# Patient Record
Sex: MALE | Race: WHITE | Employment: FULL TIME
[De-identification: names, ages, dates, MRNs, and addresses within clinical notes are randomized per-mention and may not be internally consistent; named-entity substitution may affect disease eponyms.]

---

## 2017-02-15 ENCOUNTER — OFFICE VISIT (OUTPATIENT)
Dept: INTERNAL MEDICINE | Facility: CLINIC | Age: 37
End: 2017-02-15

## 2017-02-15 VITALS
DIASTOLIC BLOOD PRESSURE: 64 MMHG | SYSTOLIC BLOOD PRESSURE: 118 MMHG | BODY MASS INDEX: 32.21 KG/M2 | HEIGHT: 70 IN | WEIGHT: 225 LBS

## 2017-02-15 DIAGNOSIS — J06.9 UPPER RESPIRATORY TRACT INFECTION, UNSPECIFIED TYPE: Primary | ICD-10-CM

## 2017-02-15 PROCEDURE — 99213 OFFICE O/P EST LOW 20 MIN: CPT | Performed by: INTERNAL MEDICINE

## 2017-02-15 RX ORDER — AZITHROMYCIN 500 MG/1
500 TABLET, FILM COATED ORAL DAILY
Qty: 1 PACKET | Refills: 0 | Status: SHIPPED | OUTPATIENT
Start: 2017-02-15 | End: 2017-02-18

## 2017-02-15 ASSESSMENT — ENCOUNTER SYMPTOMS
SPUTUM PRODUCTION: 0
SORE THROAT: 1
CONSTIPATION: 0
ABDOMINAL PAIN: 0
WHEEZING: 0
NAUSEA: 0
SHORTNESS OF BREATH: 0
VOMITING: 0
DIARRHEA: 0
COUGH: 1

## 2017-11-20 ENCOUNTER — OFFICE VISIT (OUTPATIENT)
Dept: INTERNAL MEDICINE CLINIC | Age: 37
End: 2017-11-20
Payer: COMMERCIAL

## 2017-11-20 VITALS
WEIGHT: 215 LBS | SYSTOLIC BLOOD PRESSURE: 104 MMHG | DIASTOLIC BLOOD PRESSURE: 60 MMHG | TEMPERATURE: 98.6 F | BODY MASS INDEX: 30.78 KG/M2 | HEART RATE: 100 BPM | OXYGEN SATURATION: 97 % | RESPIRATION RATE: 13 BRPM | HEIGHT: 70 IN

## 2017-11-20 DIAGNOSIS — J34.89 RHINORRHEA: ICD-10-CM

## 2017-11-20 DIAGNOSIS — J01.10 ACUTE NON-RECURRENT FRONTAL SINUSITIS: Primary | ICD-10-CM

## 2017-11-20 PROCEDURE — G8427 DOCREV CUR MEDS BY ELIG CLIN: HCPCS | Performed by: NURSE PRACTITIONER

## 2017-11-20 PROCEDURE — G8417 CALC BMI ABV UP PARAM F/U: HCPCS | Performed by: NURSE PRACTITIONER

## 2017-11-20 PROCEDURE — 1036F TOBACCO NON-USER: CPT | Performed by: NURSE PRACTITIONER

## 2017-11-20 PROCEDURE — 99213 OFFICE O/P EST LOW 20 MIN: CPT | Performed by: NURSE PRACTITIONER

## 2017-11-20 PROCEDURE — G8484 FLU IMMUNIZE NO ADMIN: HCPCS | Performed by: NURSE PRACTITIONER

## 2017-11-20 RX ORDER — AMOXICILLIN AND CLAVULANATE POTASSIUM 875; 125 MG/1; MG/1
1 TABLET, FILM COATED ORAL 2 TIMES DAILY
Qty: 20 TABLET | Refills: 0 | Status: SHIPPED | OUTPATIENT
Start: 2017-11-20 | End: 2017-11-30

## 2017-11-20 RX ORDER — FLUTICASONE PROPIONATE 50 MCG
1 SPRAY, SUSPENSION (ML) NASAL DAILY
Qty: 1 BOTTLE | Refills: 3 | Status: SHIPPED | OUTPATIENT
Start: 2017-11-20 | End: 2018-09-11 | Stop reason: ALTCHOICE

## 2017-11-20 RX ORDER — LORATADINE AND PSEUDOEPHEDRINE 10; 240 MG/1; MG/1
1 TABLET, EXTENDED RELEASE ORAL DAILY
Qty: 14 TABLET | Refills: 0 | Status: SHIPPED | OUTPATIENT
Start: 2017-11-20 | End: 2018-09-11 | Stop reason: ALTCHOICE

## 2017-11-20 ASSESSMENT — ENCOUNTER SYMPTOMS
RHINORRHEA: 1
SINUS PRESSURE: 1
SHORTNESS OF BREATH: 0
COUGH: 1
ABDOMINAL PAIN: 0
BLOOD IN STOOL: 0
SORE THROAT: 0
NAUSEA: 0
EYE DISCHARGE: 0

## 2017-11-20 ASSESSMENT — PATIENT HEALTH QUESTIONNAIRE - PHQ9
2. FEELING DOWN, DEPRESSED OR HOPELESS: 0
SUM OF ALL RESPONSES TO PHQ QUESTIONS 1-9: 0
1. LITTLE INTEREST OR PLEASURE IN DOING THINGS: 0
SUM OF ALL RESPONSES TO PHQ9 QUESTIONS 1 & 2: 0

## 2017-11-20 NOTE — PROGRESS NOTES
Visit Information    Have you changed or started any medications since your last visit including any over-the-counter medicines, vitamins, or herbal medicines? no   Are you having any side effects from any of your medications? -  no  Have you stopped taking any of your medications? Is so, why? -  no    Have you seen any other physician or provider since your last visit? No  Have you had any other diagnostic tests since your last visit? No  Have you been seen in the emergency room and/or had an admission to a hospital since we last saw you? No  Have you had your routine dental cleaning in the past 6 months? yes - 2017    Have you activated your [employer] account? If not, what are your barriers?  No: Code      Patient Care Team:  Kristopher Hyatt MD as PCP - General (Internal Medicine)    Medical History Review  Past Medical, Family, and Social History reviewed and does not contribute to the patient presenting condition    Health Maintenance   Topic Date Due    HIV screen  1993    DTaP/Tdap/Td vaccine (1 - Tdap) 1997    Flu vaccine (1) 2018 (Originally 2017)

## 2017-11-20 NOTE — PROGRESS NOTES
[facility]  [facility]  [address]  [address] [facility] [address]  Dept: [phone]  Dept Fax: [phone]    Nolan Davies is a 42 y.o. male who presents today for his medical conditions/complaints as noted below. Nolan Davies is c/o of Influenza (pt c/o of nausea,fever,body aches,congestion, loss of appetite. Pt stated it started 2 days ago)        HPI:     Patient presents with:  pt c/o of nausea,fever,body aches,congestion, loss of appetite. Pt stated it started 2 days ago  Body aches,   HA , pressue   101 * at home   [address] . Past Medical History:   Diagnosis Date    Asymptomatic varicose veins     Cervicalgia     Disorders of bilirubin excretion     Disturbance of skin sensation     Unspecified sleep apnea       Past Surgical History:   Procedure Laterality Date    APPENDECTOMY         History reviewed. No pertinent family history. Social History   Substance Use Topics    Smoking status: Never Smoker    Smokeless tobacco: Never Used    Alcohol use No      Current Outpatient Prescriptions   Medication Sig Dispense Refill    amoxicillin-clavulanate (AUGMENTIN) 875-125 MG per tablet Take 1 tablet by mouth 2 times daily for 10 days 20 tablet 0    fluticasone (FLONASE) 50 MCG/ACT nasal spray 1 spray by Nasal route daily 1 Bottle 3    loratadine-pseudoephedrine (CLARITIN-D 24 HOUR)  MG per extended release tablet Take 1 tablet by mouth daily 14 tablet 0     No current facility-administered medications for this visit. No Known Allergies      Subjective:      Review of Systems   Constitutional: Positive for chills, fatigue and fever. Negative for activity change. HENT: Positive for congestion, postnasal drip, rhinorrhea (green yellow , running alot , blowing consitstenly ) and sinus pressure. Negative for sore throat. Eyes: Negative for discharge and visual disturbance. Respiratory: Positive for cough (minimal , dry ).  Negative for shortness of m)   Wt 215 lb (97.5 kg)   SpO2 97%   BMI 30.78 kg/m²     CBC: No results found for: WBC, RBC, HGB, HCT, MCV, MCH, MCHC, RDW, PLT, MPV  CMP:  No results found for: NA, K, CL, CO2, BUN, CREATININE, GFRAA, AGRATIO, LABGLOM, GLUCOSE, PROT, LABALBU, CALCIUM, BILITOT, ALKPHOS, AST, ALT  No results found for: LABA1C        Assessment:      1. Acute non-recurrent frontal sinusitis  amoxicillin-clavulanate (AUGMENTIN) 875-125 MG per tablet    loratadine-pseudoephedrine (CLARITIN-D 24 HOUR)  MG per extended release tablet   2. Rhinorrhea  fluticasone (FLONASE) 50 MCG/ACT nasal spray    loratadine-pseudoephedrine (CLARITIN-D 24 HOUR)  MG per extended release tablet       Plan:        Dorathy Cons received counseling on the following healthy behaviors: medication adherence  Reviewed prior labs and health maintenance. Continue current medications, diet and exercise. Discussed use, benefit, and side effects of prescribed medications. Barriers to medication compliance addressed. Patient given educational materials - see patient instructions. All patient questions answered. Patient voiced understanding. Return if symptoms worsen or fail to improve. No orders of the defined types were placed in this encounter. Orders Placed This Encounter   Medications    amoxicillin-clavulanate (AUGMENTIN) 875-125 MG per tablet     Sig: Take 1 tablet by mouth 2 times daily for 10 days     Dispense:  20 tablet     Refill:  0    fluticasone (FLONASE) 50 MCG/ACT nasal spray     Si spray by Nasal route daily     Dispense:  1 Bottle     Refill:  3    loratadine-pseudoephedrine (CLARITIN-D 24 HOUR)  MG per extended release tablet     Sig: Take 1 tablet by mouth daily     Dispense:  14 tablet     Refill:  0       Patient given verbal and/or written educational instructions. Follow up as directed. I have reviewed and agree with the nursing documentation.     Electronically signed by Marissa Roland NP on 11/20/2017 at 10:36 AM

## 2018-01-17 ENCOUNTER — OFFICE VISIT (OUTPATIENT)
Dept: INTERNAL MEDICINE CLINIC | Age: 38
End: 2018-01-17
Payer: COMMERCIAL

## 2018-01-17 VITALS
HEIGHT: 70 IN | DIASTOLIC BLOOD PRESSURE: 62 MMHG | HEART RATE: 67 BPM | SYSTOLIC BLOOD PRESSURE: 93 MMHG | WEIGHT: 214 LBS | BODY MASS INDEX: 30.64 KG/M2

## 2018-01-17 DIAGNOSIS — R40.0 SOMNOLENCE: ICD-10-CM

## 2018-01-17 DIAGNOSIS — G47.33 OSA (OBSTRUCTIVE SLEEP APNEA): Primary | ICD-10-CM

## 2018-01-17 DIAGNOSIS — Z00.00 PREVENTATIVE HEALTH CARE: ICD-10-CM

## 2018-01-17 DIAGNOSIS — R53.83 FATIGUE, UNSPECIFIED TYPE: ICD-10-CM

## 2018-01-17 PROCEDURE — G8417 CALC BMI ABV UP PARAM F/U: HCPCS | Performed by: NURSE PRACTITIONER

## 2018-01-17 PROCEDURE — 99213 OFFICE O/P EST LOW 20 MIN: CPT | Performed by: NURSE PRACTITIONER

## 2018-01-17 PROCEDURE — 1036F TOBACCO NON-USER: CPT | Performed by: NURSE PRACTITIONER

## 2018-01-17 PROCEDURE — G8427 DOCREV CUR MEDS BY ELIG CLIN: HCPCS | Performed by: NURSE PRACTITIONER

## 2018-01-17 PROCEDURE — G8484 FLU IMMUNIZE NO ADMIN: HCPCS | Performed by: NURSE PRACTITIONER

## 2018-01-17 ASSESSMENT — ENCOUNTER SYMPTOMS
EYE DISCHARGE: 0
BLOOD IN STOOL: 0
ABDOMINAL PAIN: 0
COUGH: 0
APNEA: 1
SHORTNESS OF BREATH: 0

## 2018-01-17 NOTE — PROGRESS NOTES
[facility]  [facility]  [address]  [address] [facility] [address]  Dept: [phone]  Dept Fax: [phone]    Gordy Dunaway is a 42 y.o. male who presents today for his medical conditions/complaints as noted below. Gordy Dunaway is c/o of Sleep Apnea (snoring, witnessed apnea, excessive daytime somnolence )        HPI:     Patient presents with:  Sleep Apnea: very loud snoring, witnessed apnea, excessive daytime somnolence             Past Medical History:   Diagnosis Date    Asymptomatic varicose veins     Cervicalgia     Disorders of bilirubin excretion     Disturbance of skin sensation     Unspecified sleep apnea       Past Surgical History:   Procedure Laterality Date    APPENDECTOMY         History reviewed. No pertinent family history. Social History   Substance Use Topics    Smoking status: Never Smoker    Smokeless tobacco: Never Used    Alcohol use No      Current Outpatient Prescriptions   Medication Sig Dispense Refill    fluticasone (FLONASE) 50 MCG/ACT nasal spray 1 spray by Nasal route daily 1 Bottle 3    loratadine-pseudoephedrine (CLARITIN-D 24 HOUR)  MG per extended release tablet Take 1 tablet by mouth daily 14 tablet 0     No current facility-administered medications for this visit. No Known Allergies      Subjective:      Review of Systems   Constitutional: Positive for fatigue (sleepiness t/o day ). Negative for activity change, chills and fever. Eyes: Negative for discharge and visual disturbance. Respiratory: Positive for apnea. Negative for cough and shortness of breath. Snoring    Cardiovascular: Negative for chest pain and leg swelling. Gastrointestinal: Negative for abdominal pain and blood in stool. Endocrine: Negative for cold intolerance and heat intolerance. Genitourinary: Negative for dysuria and flank pain. Musculoskeletal: Negative for joint swelling and myalgias. Skin: Negative for pallor and rash.    Neurological: Somnolence  TSH with Reflex   3. Fatigue, unspecified type  TSH with Reflex   4. Preventative health care  Comprehensive Metabolic Panel    CBC       Plan:      1. MAGDIEL (obstructive sleep apnea)  Mult sx  Baseline Diagnostic Sleep Study   2. Somnolence  Labs ordered    TSH with Reflex   3. Fatigue, unspecified type   Labs ordered        TSH with Reflex   4. Preventative health care  Comprehensive Metabolic Panel    CBC       Lissette Gent received counseling on the following healthy behaviors: medication adherence  Reviewed prior labs and health maintenance  Continue current medications, diet and exercise. Discussed use, benefit, and side effects of prescribed medications. Barriers to medication compliance addressed. Patient given educational materials - see patient instructions  Was a self-tracking handout given in paper form or via [employer]? Yes    Requested Prescriptions      No prescriptions requested or ordered in this encounter       All patient questions answered. Patient voiced understanding. Quality Measures    Body mass index is 30.64 kg/m². Elevated. Weight control planned discussed Healthy diet and regular exercise. BP: 93/62 Blood pressure is low. Treatment plan consists of No treatment change needed. No results found for: LDLCALC, LDLCHOLESTEROL, LDLDIRECT (goal LDL reduction with dx if diabetes is 50% LDL reduction)      PHQ Scores 11/20/2017   PHQ2 Score 0   PHQ9 Score 0     Interpretation of Total Score Depression Severity: 1-4 = Minimal depression, 5-9 = Mild depression, 10-14 = Moderate depression, 15-19 = Moderately severe depression, 20-27 = Severe depression    Return if symptoms worsen or fail to improve.     Orders Placed This Encounter   Procedures    TSH with Reflex     Standing Status:   Future     Standing Expiration Date:   1/18/2019    Comprehensive Metabolic Panel     Standing Status:   Future     Standing Expiration Date:   1/17/2019    CBC     Standing Status:   Future Standing Expiration Date:   1/17/2019    Baseline Diagnostic Sleep Study     Standing Status:   Future     Standing Expiration Date:   1/17/2019     Order Specific Question:   Adult or Pediatric     Answer:   Adult Study (>7 Years)     Order Specific Question:   Location For Sleep Study     [facility]: [facility] Specific Question:   Select Sleep Lab Location     Answer:   [address]     No orders of the defined types were placed in this encounter. Patient given verbal and/or written educational instructions. Follow up as directed. I have reviewed and agree with the nursing documentation.     Electronically signed by Elyse Fierro NP on 1/17/2018 at 4:33 PM

## 2018-02-19 ENCOUNTER — HOSPITAL ENCOUNTER (OUTPATIENT)
Dept: SLEEP CENTER | Age: 38
Discharge: HOME | End: 2018-02-21
Payer: COMMERCIAL

## 2018-02-19 DIAGNOSIS — G47.33 OSA (OBSTRUCTIVE SLEEP APNEA): ICD-10-CM

## 2018-02-19 PROCEDURE — 95810 POLYSOM 6/> YRS 4/> PARAM: CPT

## 2018-02-20 VITALS
HEIGHT: 70 IN | HEART RATE: 67 BPM | WEIGHT: 214 LBS | BODY MASS INDEX: 30.64 KG/M2 | SYSTOLIC BLOOD PRESSURE: 93 MMHG | RESPIRATION RATE: 20 BRPM | DIASTOLIC BLOOD PRESSURE: 62 MMHG

## 2018-02-20 ASSESSMENT — SLEEP AND FATIGUE QUESTIONNAIRES
HOW LIKELY ARE YOU TO NOD OFF OR FALL ASLEEP WHILE SITTING AND TALKING TO SOMEONE: 0
HOW LIKELY ARE YOU TO NOD OFF OR FALL ASLEEP WHILE WATCHING TV: 3
HOW LIKELY ARE YOU TO NOD OFF OR FALL ASLEEP WHILE SITTING AND READING: 2
HOW LIKELY ARE YOU TO NOD OFF OR FALL ASLEEP WHILE LYING DOWN TO REST IN THE AFTERNOON WHEN CIRCUMSTANCES PERMIT: 3
HOW LIKELY ARE YOU TO NOD OFF OR FALL ASLEEP WHEN YOU ARE A PASSENGER IN A CAR FOR AN HOUR WITHOUT A BREAK: 2
ESS TOTAL SCORE: 12
HOW LIKELY ARE YOU TO NOD OFF OR FALL ASLEEP WHILE SITTING INACTIVE IN A PUBLIC PLACE: 0
HOW LIKELY ARE YOU TO NOD OFF OR FALL ASLEEP IN A CAR, WHILE STOPPED FOR A FEW MINUTES IN TRAFFIC: 0
HOW LIKELY ARE YOU TO NOD OFF OR FALL ASLEEP WHILE SITTING QUIETLY AFTER LUNCH WITHOUT ALCOHOL: 2

## 2018-03-12 ENCOUNTER — TELEPHONE (OUTPATIENT)
Dept: INTERNAL MEDICINE CLINIC | Age: 38
End: 2018-03-12

## 2018-03-12 DIAGNOSIS — G47.33 OSA (OBSTRUCTIVE SLEEP APNEA): ICD-10-CM

## 2018-04-04 ENCOUNTER — HOSPITAL ENCOUNTER (OUTPATIENT)
Dept: SLEEP CENTER | Age: 38
Discharge: HOME | End: 2018-04-06
Payer: COMMERCIAL

## 2018-04-04 VITALS
HEIGHT: 70 IN | HEART RATE: 76 BPM | RESPIRATION RATE: 20 BRPM | SYSTOLIC BLOOD PRESSURE: 90 MMHG | WEIGHT: 215 LBS | BODY MASS INDEX: 30.78 KG/M2 | DIASTOLIC BLOOD PRESSURE: 60 MMHG

## 2018-04-04 DIAGNOSIS — G47.33 OSA (OBSTRUCTIVE SLEEP APNEA): ICD-10-CM

## 2018-04-04 PROCEDURE — 95811 POLYSOM 6/>YRS CPAP 4/> PARM: CPT

## 2018-08-07 ENCOUNTER — TELEPHONE (OUTPATIENT)
Dept: INTERNAL MEDICINE CLINIC | Age: 38
End: 2018-08-07

## 2018-08-07 NOTE — TELEPHONE ENCOUNTER
Patients mother diagnosed with breast cancer, which was positive for pathogenic mutation (BCRA2) and her oncologist is advising all of her children to have testing completed. Please advise and let patient know.

## 2018-08-14 ENCOUNTER — TELEPHONE (OUTPATIENT)
Dept: INTERNAL MEDICINE CLINIC | Age: 38
End: 2018-08-14

## 2018-08-14 DIAGNOSIS — Z14.8 GENETIC CARRIER OF HERITABLE CANCER: Primary | ICD-10-CM

## 2018-08-23 ENCOUNTER — TELEPHONE (OUTPATIENT)
Dept: ONCOLOGY | Age: 38
End: 2018-08-23

## 2018-09-10 ENCOUNTER — HOSPITAL ENCOUNTER (OUTPATIENT)
Age: 38
Discharge: HOME | End: 2018-09-10
Payer: COMMERCIAL

## 2018-09-10 DIAGNOSIS — R53.83 FATIGUE, UNSPECIFIED TYPE: ICD-10-CM

## 2018-09-10 DIAGNOSIS — R40.0 SOMNOLENCE: ICD-10-CM

## 2018-09-10 DIAGNOSIS — Z00.00 PREVENTATIVE HEALTH CARE: ICD-10-CM

## 2018-09-10 LAB
ALBUMIN SERPL-MCNC: 4.6 G/DL (ref 3.5–5.2)
ALBUMIN/GLOBULIN RATIO: ABNORMAL (ref 1–2.5)
ALP BLD-CCNC: 63 U/L (ref 40–129)
ALT SERPL-CCNC: 28 U/L (ref 5–41)
ANION GAP SERPL CALCULATED.3IONS-SCNC: 11 MMOL/L (ref 9–17)
AST SERPL-CCNC: 24 U/L
BILIRUB SERPL-MCNC: 1.47 MG/DL (ref 0.3–1.2)
BUN BLDV-MCNC: 13 MG/DL (ref 6–20)
BUN/CREAT BLD: ABNORMAL (ref 9–20)
CALCIUM SERPL-MCNC: 9.4 MG/DL (ref 8.6–10.4)
CHLORIDE BLD-SCNC: 104 MMOL/L (ref 98–107)
CO2: 28 MMOL/L (ref 20–31)
CREAT SERPL-MCNC: 0.9 MG/DL (ref 0.7–1.2)
GFR AFRICAN AMERICAN: >60 ML/MIN
GFR NON-AFRICAN AMERICAN: >60 ML/MIN
GFR SERPL CREATININE-BSD FRML MDRD: ABNORMAL ML/MIN/{1.73_M2}
GFR SERPL CREATININE-BSD FRML MDRD: ABNORMAL ML/MIN/{1.73_M2}
GLUCOSE BLD-MCNC: 100 MG/DL (ref 70–99)
HCT VFR BLD CALC: 47.4 % (ref 41–53)
HEMOGLOBIN: 15.7 G/DL (ref 13.5–17.5)
MCH RBC QN AUTO: 30 PG (ref 26–34)
MCHC RBC AUTO-ENTMCNC: 33.1 G/DL (ref 31–37)
MCV RBC AUTO: 90.4 FL (ref 80–100)
NRBC AUTOMATED: NORMAL PER 100 WBC
PDW BLD-RTO: 13.4 % (ref 11.5–14.9)
PLATELET # BLD: 197 K/UL (ref 150–450)
PMV BLD AUTO: 8.6 FL (ref 6–12)
POTASSIUM SERPL-SCNC: 4.4 MMOL/L (ref 3.7–5.3)
RBC # BLD: 5.24 M/UL (ref 4.5–5.9)
SODIUM BLD-SCNC: 143 MMOL/L (ref 135–144)
TOTAL PROTEIN: 6.8 G/DL (ref 6.4–8.3)
TSH SERPL DL<=0.05 MIU/L-ACNC: 1.21 MIU/L (ref 0.3–5)
WBC # BLD: 5.5 K/UL (ref 3.5–11)

## 2018-09-10 PROCEDURE — 36415 COLL VENOUS BLD VENIPUNCTURE: CPT

## 2018-09-10 PROCEDURE — 84443 ASSAY THYROID STIM HORMONE: CPT

## 2018-09-10 PROCEDURE — 80053 COMPREHEN METABOLIC PANEL: CPT

## 2018-09-10 PROCEDURE — 85027 COMPLETE CBC AUTOMATED: CPT

## 2018-09-11 ENCOUNTER — INITIAL CONSULT (OUTPATIENT)
Dept: ONCOLOGY | Age: 38
End: 2018-09-11
Payer: COMMERCIAL

## 2018-09-11 ENCOUNTER — CLINICAL DOCUMENTATION (OUTPATIENT)
Dept: ONCOLOGY | Age: 38
End: 2018-09-11

## 2018-09-11 VITALS
SYSTOLIC BLOOD PRESSURE: 111 MMHG | HEART RATE: 93 BPM | WEIGHT: 228.19 LBS | TEMPERATURE: 98.8 F | BODY MASS INDEX: 32.67 KG/M2 | HEIGHT: 70 IN | DIASTOLIC BLOOD PRESSURE: 68 MMHG

## 2018-09-11 DIAGNOSIS — Z84.81 FAMILY HISTORY OF BRCA GENE MUTATION: Primary | ICD-10-CM

## 2018-09-11 PROCEDURE — 99244 OFF/OP CNSLTJ NEW/EST MOD 40: CPT | Performed by: INTERNAL MEDICINE

## 2018-09-11 PROCEDURE — G8417 CALC BMI ABV UP PARAM F/U: HCPCS | Performed by: INTERNAL MEDICINE

## 2018-09-11 PROCEDURE — G8427 DOCREV CUR MEDS BY ELIG CLIN: HCPCS | Performed by: INTERNAL MEDICINE

## 2018-09-11 PROCEDURE — 99201 HC NEW PT, E/M LEVEL 1: CPT | Performed by: INTERNAL MEDICINE

## 2018-09-24 ENCOUNTER — TELEPHONE (OUTPATIENT)
Dept: ONCOLOGY | Age: 38
End: 2018-09-24

## 2018-09-26 NOTE — TELEPHONE ENCOUNTER
[facility]   Hereditary Cancer Risk Assessment     Name: Shawna Cevallos  YOB: 1980  Date of Results Disclosure: 9/24/18    HISTORY   Mr. Jonas Tovar was seen for genetic counseling on 9/11/18 at the request of Dr. Jo Sánchez due to his family history of breast cancer due to a known BRCA2 gene mutation. At that time, Mr. Jonas Tovar chose to pursue genetic testing for the familial BRCA2 gene mutation. These results were discussed with Mr. Jonas Tovar on 9/24/18 via telephone. A summary of Mr. Abhishek Davenport results and recommendations are below. RESULTS  [employer]   Specific Site Analysis BRCA2 [id number]: NEGATIVE - NO CLINICALLY SIGNIFICANT MUTATIONS DETECTED   Please refer to genetic test report for technical details. We discussed that Mr. Abhishek Davenport test result confirms that he does not carry the pathogenic BRCA2 mutation that has previously been identified in the family. Mr. Abhishek Davenport lifetime risk for cancer is expected to be equal to the general population risk. Therefore, Mr. Jonas Tovar should continue general population cancer screening guidelines as directed by his physicians. Of note, Mr. Jonas Tovar has a significant family history of prostate cancer which has not been linked entirely to the familial BRCA2 mutation. This includes a maternal uncle with prostate cancer in his 62s and a maternal great uncle with prostate cancer. Therefore, Mr. Jonas Tovar may consider increased prostate cancer screening based on his family history and we encourage him to discuss this with his physicians. RECOMMENDATIONS FOR FAMILY MEMBERS   1) Genetic testing is not recommended for Mr. Abhishek Davenport children based on his negative test results. However, this recommendation does not take into consideration any family history of cancer in their maternal family. 2) We encourage Mr. Abhishek Davenport relatives to consider genetic counseling and testing for the familial BRCA2 gene mutation.  Relatives may contact the [facility])

## 2019-06-07 ENCOUNTER — TELEPHONE (OUTPATIENT)
Dept: INTERNAL MEDICINE CLINIC | Age: 39
End: 2019-06-07

## 2019-06-11 ENCOUNTER — OFFICE VISIT (OUTPATIENT)
Dept: INTERNAL MEDICINE CLINIC | Age: 39
End: 2019-06-11
Payer: COMMERCIAL

## 2019-06-11 VITALS — HEIGHT: 70 IN | BODY MASS INDEX: 34.22 KG/M2 | WEIGHT: 239 LBS

## 2019-06-11 DIAGNOSIS — L23.7 CONTACT DERMATITIS DUE TO POISON IVY: Primary | ICD-10-CM

## 2019-06-11 PROCEDURE — G8417 CALC BMI ABV UP PARAM F/U: HCPCS | Performed by: PHYSICIAN ASSISTANT

## 2019-06-11 PROCEDURE — 1036F TOBACCO NON-USER: CPT | Performed by: PHYSICIAN ASSISTANT

## 2019-06-11 PROCEDURE — 99213 OFFICE O/P EST LOW 20 MIN: CPT | Performed by: PHYSICIAN ASSISTANT

## 2019-06-11 PROCEDURE — G8427 DOCREV CUR MEDS BY ELIG CLIN: HCPCS | Performed by: PHYSICIAN ASSISTANT

## 2019-06-11 RX ORDER — CLOBETASOL PROPIONATE 0.5 MG/G
CREAM TOPICAL DAILY
Qty: 60 G | Refills: 0 | Status: SHIPPED | OUTPATIENT
Start: 2019-06-11 | End: 2020-10-15

## 2019-06-11 ASSESSMENT — ENCOUNTER SYMPTOMS
NAUSEA: 0
CHEST TIGHTNESS: 0
COUGH: 0
EYE ITCHING: 0
VOMITING: 0
SHORTNESS OF BREATH: 0
ABDOMINAL PAIN: 0
EYE DISCHARGE: 0
WHEEZING: 0
EYE REDNESS: 0

## 2019-06-11 ASSESSMENT — PATIENT HEALTH QUESTIONNAIRE - PHQ9
1. LITTLE INTEREST OR PLEASURE IN DOING THINGS: 0
SUM OF ALL RESPONSES TO PHQ9 QUESTIONS 1 & 2: 0
SUM OF ALL RESPONSES TO PHQ QUESTIONS 1-9: 0
SUM OF ALL RESPONSES TO PHQ QUESTIONS 1-9: 0
2. FEELING DOWN, DEPRESSED OR HOPELESS: 0

## 2019-06-11 NOTE — PROGRESS NOTES
Visit Information    Have you changed or started any medications since your last visit including any over-the-counter medicines, vitamins, or herbal medicines? no   Are you having any side effects from any of your medications? -  no  Have you stopped taking any of your medications? Is so, why? -  no    Have you seen any other physician or provider since your last visit? No  Have you had any other diagnostic tests since your last visit? No  Have you been seen in the emergency room and/or had an admission to a hospital since we last saw you? No  Have you had your routine dental cleaning in the past 6 months? no    Have you activated your [employer] account? If not, what are your barriers?  No:      Patient Care Team:  Yao Estrella MD as PCP - General (Internal Medicine)  Yao Estrella MD as PCP - [facility]    Medical History Review  Past Medical, Family, and Social History reviewed and does contribute to the patient presenting condition    Health Maintenance   Topic Date Due    HIV screen  08/13/1993    DTaP/Tdap/Td vaccine (1 - Tdap) 08/13/1997    Lipid screen  08/13/2018    Flu vaccine (Season Ended) 09/01/2019    Pneumococcal 0-64 years Vaccine  Aged Out
adenopathy. PHYSICAL EXAM:      Vitals:    06/11/19 1341   Weight: 239 lb (108.4 kg)   Height: 5' 10\" (1.778 m)     General - alert, well appearing, and in no distress  Skin - normal coloration and turgor, erythematous maculopapular rash present in clusters, forearms, lower extremities, no vesicles present, covered in calamine lotion    Eyes - pupils equal and reactive, extraocular eye movements intact  Mouth - mucous membranes moist  Neck - supple, no significant adenopathy, no palpable masses  Chest - clear to auscultation, symmetric air entry  Heart - normal rate, regular rhythm, no murmur  Abdomen - non distended, soft, no tenderness to palpation   Neurological - alert, oriented, normal speech, no focal sensory or motor deficit  Extremities - no pedal edema    ASSESSMENT AND PLAN:      1. Contact dermatitis due to poison ivy  - will start high potency topical steroid, avoid use for greater than two weeks   - anti histamine as needed, benadryl at night time  - will consider oral prednisone taper if symptoms not controlled with above   - clobetasol prop emollient base (CLOBETASOL PROPIONATE E) 0.05 % CREA; Apply topically daily  Dispense: 60 g; Refill: 0    FOLLOW UP AND INSTRUCTIONS:   Return if symptoms worsen or fail to improve. Discussed use, benefit, and side effects of prescribed medications. All patient questions answered. Patient voiced understanding. Patient given educational materials - see patient instructions    Shirley MALONE [facility]  6/11/2019, 2:03 PM    Please note that this chart wasgenerated using voice recognition Dragon dictation software. Although every effort was made to ensure the accuracy of this automated transcription, some errors in transcription may have occurred.

## 2019-06-13 ENCOUNTER — TELEPHONE (OUTPATIENT)
Dept: INTERNAL MEDICINE CLINIC | Age: 39
End: 2019-06-13

## 2019-06-13 DIAGNOSIS — L23.7 CONTACT DERMATITIS DUE TO POISON IVY: Primary | ICD-10-CM

## 2019-06-13 RX ORDER — METHYLPREDNISOLONE 4 MG/1
TABLET ORAL
Qty: 1 KIT | Refills: 0 | Status: SHIPPED | OUTPATIENT
Start: 2019-06-13 | End: 2019-06-19

## 2019-06-13 NOTE — TELEPHONE ENCOUNTER
Call from pt he was seen for poison Ivy and he iss using the cream but the rash is spreading. Now on his stomach and sind he was told that a sternoid if needed .   Pharm [facility]

## 2019-07-01 ENCOUNTER — TELEPHONE (OUTPATIENT)
Dept: INTERNAL MEDICINE CLINIC | Age: 39
End: 2019-07-01

## 2019-09-24 ENCOUNTER — OFFICE VISIT (OUTPATIENT)
Dept: INTERNAL MEDICINE CLINIC | Age: 39
End: 2019-09-24
Payer: COMMERCIAL

## 2019-09-24 VITALS
OXYGEN SATURATION: 97 % | HEIGHT: 70 IN | HEART RATE: 77 BPM | WEIGHT: 219 LBS | BODY MASS INDEX: 31.35 KG/M2 | DIASTOLIC BLOOD PRESSURE: 62 MMHG | SYSTOLIC BLOOD PRESSURE: 136 MMHG

## 2019-09-24 DIAGNOSIS — R53.83 FATIGUE DUE TO SLEEP PATTERN DISTURBANCE: ICD-10-CM

## 2019-09-24 DIAGNOSIS — E66.9 OBESITY (BMI 30.0-34.9): ICD-10-CM

## 2019-09-24 DIAGNOSIS — G47.9 FATIGUE DUE TO SLEEP PATTERN DISTURBANCE: ICD-10-CM

## 2019-09-24 DIAGNOSIS — G47.33 OSA (OBSTRUCTIVE SLEEP APNEA): Primary | ICD-10-CM

## 2019-09-24 PROCEDURE — 99213 OFFICE O/P EST LOW 20 MIN: CPT | Performed by: PHYSICIAN ASSISTANT

## 2019-09-24 PROCEDURE — 1036F TOBACCO NON-USER: CPT | Performed by: PHYSICIAN ASSISTANT

## 2019-09-24 PROCEDURE — G8427 DOCREV CUR MEDS BY ELIG CLIN: HCPCS | Performed by: PHYSICIAN ASSISTANT

## 2019-09-24 PROCEDURE — G8417 CALC BMI ABV UP PARAM F/U: HCPCS | Performed by: PHYSICIAN ASSISTANT

## 2019-09-24 NOTE — PROGRESS NOTES
[facility]  [facility]  [address] [address]  Dept: [phone]  Dept Fax: [phone]    OfficeProgress/Follow Up Note  Date of patient's visit: 9/24/2019  Patient's Name:  Daysi Howell YOB: 1980            Patient Care Team:  Leonel Nichole MD as PCP - General (Internal Medicine)  Leonel Nichole MD as PCP - [facility]    REASON FOR VISIT:  Routine outpatient follow up    HISTORY OF PRESENT ILLNESS:      Chief Complaint   Patient presents with    Sleep Apnea     pt new dx, a yeara ago by [facility], would like fmla forms completed     History was obtained from the patient. Daysi Howell is a 37 y.o. male here today for above. Obstructive sleep apnea. Patient reports occasionally missing work due to long shifts. He requires CPAP use nightly. Reports daytime somnolence, fatigue without use of CPAP. No recent chest pain/pressure. No dyspnea on exertion. Patient Active Problem List   Diagnosis    MAGDIEL (obstructive sleep apnea)    Obesity (BMI 30.0-34. 9)    Fatigue due to sleep pattern disturbance     Health Maintenance Due   Topic Date Due    HIV screen  08/13/1993    DTaP/Tdap/Td vaccine (1 - Tdap) 08/13/1997    Lipid screen  08/13/2018    Flu vaccine (1) 09/01/2019     MEDICATIONS:      Current Outpatient Medications   Medication Sig Dispense Refill    clobetasol prop emollient base (CLOBETASOL PROPIONATE E) 0.05 % CREA Apply topically daily (Patient not taking: Reported on 9/24/2019) 60 g 0     No current facility-administered medications for this visit. ALLERGIES:    No Known Allergies      SOCIAL HISTORY    Reviewed and no change from previous record. Manoj Le  reports that he has never smoked.  He has never used smokeless tobacco.    FAMILY HISTORY:    Reviewed and No change from previous visit    REVIEW OF SYSTEMS:    Review of Systems    PHYSICAL EXAM:      Vitals:    09/24/19 1352   BP: 136/62   Pulse: 77

## 2019-09-26 ENCOUNTER — TELEPHONE (OUTPATIENT)
Dept: INTERNAL MEDICINE CLINIC | Age: 39
End: 2019-09-26

## 2020-06-29 ENCOUNTER — OFFICE VISIT (OUTPATIENT)
Dept: INTERNAL MEDICINE CLINIC | Age: 40
End: 2020-06-29
Payer: COMMERCIAL

## 2020-06-29 VITALS
OXYGEN SATURATION: 97 % | TEMPERATURE: 97.2 F | HEIGHT: 70 IN | BODY MASS INDEX: 32.5 KG/M2 | HEART RATE: 80 BPM | DIASTOLIC BLOOD PRESSURE: 70 MMHG | WEIGHT: 227 LBS | SYSTOLIC BLOOD PRESSURE: 120 MMHG

## 2020-06-29 PROCEDURE — 1036F TOBACCO NON-USER: CPT | Performed by: PHYSICIAN ASSISTANT

## 2020-06-29 PROCEDURE — G8427 DOCREV CUR MEDS BY ELIG CLIN: HCPCS | Performed by: PHYSICIAN ASSISTANT

## 2020-06-29 PROCEDURE — G8417 CALC BMI ABV UP PARAM F/U: HCPCS | Performed by: PHYSICIAN ASSISTANT

## 2020-06-29 PROCEDURE — 99214 OFFICE O/P EST MOD 30 MIN: CPT | Performed by: PHYSICIAN ASSISTANT

## 2020-06-29 RX ORDER — LORAZEPAM 0.5 MG/1
0.5 TABLET ORAL 3 TIMES DAILY PRN
Qty: 6 TABLET | Refills: 0 | Status: SHIPPED | OUTPATIENT
Start: 2020-06-29 | End: 2020-07-29

## 2020-06-29 ASSESSMENT — ENCOUNTER SYMPTOMS
BACK PAIN: 0
COUGH: 0
BLOOD IN STOOL: 0
ABDOMINAL PAIN: 0
NAUSEA: 0
EYE ITCHING: 0
CONSTIPATION: 0
RHINORRHEA: 0
EYE PAIN: 0
TROUBLE SWALLOWING: 0
VOICE CHANGE: 0
DIARRHEA: 0
SINUS PAIN: 0
WHEEZING: 0
COLOR CHANGE: 0
SHORTNESS OF BREATH: 0
VOMITING: 0
CHEST TIGHTNESS: 0
EYE DISCHARGE: 0
SORE THROAT: 0

## 2020-06-29 ASSESSMENT — PATIENT HEALTH QUESTIONNAIRE - PHQ9
SUM OF ALL RESPONSES TO PHQ QUESTIONS 1-9: 0
2. FEELING DOWN, DEPRESSED OR HOPELESS: 0
1. LITTLE INTEREST OR PLEASURE IN DOING THINGS: 0
SUM OF ALL RESPONSES TO PHQ QUESTIONS 1-9: 0
SUM OF ALL RESPONSES TO PHQ9 QUESTIONS 1 & 2: 0

## 2020-06-29 NOTE — PROGRESS NOTES
suspicious skin lesions noted  Eyes - pupils equal and reactive, extraocular eye movements intact  Ears - bilateral TM's and external ear canals normal  Nose - normal and patent, no erythema, discharge or polyps  Mouth - mucous membranes are moist, pharynx normal without lesions  Neck - supple, no significant adenopathy, no palpable masses   Lymphatics - no palpable lymphadenopathy, no hepatosplenomegaly  Chest - clear to auscultation, no wheezes, rales or rhonchi, symmetric air entry  Heart - normal rate, rhythm is regular, no murmurs, rubs, clicks or gallops  Abdomen - soft, nontender, nondistended, no masses or organomegaly  Back - full range of motion, no tenderness, palpable spasm or pain on motion  Neurological - alert, oriented, normal speech, no focal sensory or motor deficit  Musculoskeletal - no joint tenderness, deformity or swelling  Extremities - peripheral pulses normal, no pedal edema    LABORATORY FINDINGS:    CBC:  Lab Results   Component Value Date    WBC 5.5 09/10/2018    HGB 15.7 09/10/2018     09/10/2018     BMP:    Lab Results   Component Value Date     09/10/2018    K 4.4 09/10/2018     09/10/2018    CO2 28 09/10/2018    BUN 13 09/10/2018    CREATININE 0.90 09/10/2018    GLUCOSE 100 09/10/2018     HEMOGLOBIN A1C: No results found for: LABA1C    FASTING LIPID PANEL:No results found for: CHOL, HDL, TRIG    ASSESSMENT AND PLAN:      1. MAGDIEL (obstructive sleep apnea)  - Controlled, compliant with CPAP, continue present management  - Paperwork completed FMLA    2. Obesity (BMI 30.0-34.9)  - Advised to continue weight loss, dietary changes, increase exercise    3. Fear of flying  - LORazepam (ATIVAN) 0.5 MG tablet; Take 1 tablet by mouth 3 times daily as needed for Anxiety for up to 30 days. Dispense: 6 tablet; Refill: 0    4. Screening for hyperlipidemia  - Lipid, Fasting; Future    5.  Family history of cancer  - Patient was seen by oncologist, had genetic testing, continue age-appropriate cancer screening    FOLLOW UP AND INSTRUCTIONS:   Return in about 6 months (around 12/29/2020). Ora Gómez received counseling on the following healthy behaviors: nutrition, exercise and medication adherence    Discussed use, benefit, and side effects of prescribed medications. Barriers to medication compliance addressed. All patient questions answered. Patient voiced understanding. Patient given educational materials - see patient instructions    MAYELA Lipscomb [facility]  6/29/2020, 1:47 PM    Please note that this chart was generated using voice recognition Dragon dictation software. Although everyeffort was made to ensure the accuracy of this automated transcription, some errors in transcription may have occurred.

## 2020-10-09 ENCOUNTER — HOSPITAL ENCOUNTER (OUTPATIENT)
Age: 40
Setting detail: SPECIMEN
Discharge: HOME | End: 2020-10-09
Payer: COMMERCIAL

## 2020-10-09 LAB
CHOLESTEROL, FASTING: 220 MG/DL
CHOLESTEROL/HDL RATIO: 5.5
HDLC SERPL-MCNC: 40 MG/DL
LDL CHOLESTEROL: 153 MG/DL (ref 0–130)
TRIGLYCERIDE, FASTING: 137 MG/DL
VLDLC SERPL CALC-MCNC: ABNORMAL MG/DL (ref 1–30)

## 2020-10-15 ENCOUNTER — OFFICE VISIT (OUTPATIENT)
Dept: INTERNAL MEDICINE CLINIC | Age: 40
End: 2020-10-15
Payer: COMMERCIAL

## 2020-10-15 VITALS
RESPIRATION RATE: 16 BRPM | TEMPERATURE: 98 F | HEART RATE: 72 BPM | SYSTOLIC BLOOD PRESSURE: 118 MMHG | DIASTOLIC BLOOD PRESSURE: 76 MMHG | WEIGHT: 214 LBS | HEIGHT: 70 IN | BODY MASS INDEX: 30.64 KG/M2

## 2020-10-15 PROCEDURE — G8417 CALC BMI ABV UP PARAM F/U: HCPCS | Performed by: NURSE PRACTITIONER

## 2020-10-15 PROCEDURE — 99214 OFFICE O/P EST MOD 30 MIN: CPT | Performed by: NURSE PRACTITIONER

## 2020-10-15 PROCEDURE — G8484 FLU IMMUNIZE NO ADMIN: HCPCS | Performed by: NURSE PRACTITIONER

## 2020-10-15 PROCEDURE — G8427 DOCREV CUR MEDS BY ELIG CLIN: HCPCS | Performed by: NURSE PRACTITIONER

## 2020-10-15 PROCEDURE — 1036F TOBACCO NON-USER: CPT | Performed by: NURSE PRACTITIONER

## 2020-10-15 ASSESSMENT — ENCOUNTER SYMPTOMS
BLOOD IN STOOL: 0
SORE THROAT: 0
WHEEZING: 0
COUGH: 0
SHORTNESS OF BREATH: 0
ABDOMINAL PAIN: 1
CONSTIPATION: 1
RECTAL PAIN: 0
BACK PAIN: 1
TROUBLE SWALLOWING: 0
VOMITING: 0
NAUSEA: 0

## 2020-10-15 ASSESSMENT — PATIENT HEALTH QUESTIONNAIRE - PHQ9
1. LITTLE INTEREST OR PLEASURE IN DOING THINGS: 0
SUM OF ALL RESPONSES TO PHQ9 QUESTIONS 1 & 2: 0
SUM OF ALL RESPONSES TO PHQ QUESTIONS 1-9: 0
2. FEELING DOWN, DEPRESSED OR HOPELESS: 0

## 2020-10-15 NOTE — PATIENT INSTRUCTIONS
can increase your chances of quitting for good. How is high cholesterol treated? The goal of treatment is to reduce your chances of having a heart attack or stroke. The goal is not to lower your cholesterol numbers only. · You may make lifestyle changes, such as eating healthy foods, not smoking, losing weight, and being more active. · You may have to take medicine. Follow-up care is a key part of your treatment and safety. Be sure to make and go to all appointments, and call your doctor if you are having problems. It's also a good idea to know your test results and keep a list of the medicines you take. Where can you learn more? Go to [email or web address] and sign in to your [employer] account. Enter W469 in the [employer] box to learn more about \"Learning About High Cholesterol. \"     If you do not have an account, please click on the \"Sign Up Now\" link. Current as of: December 16, 2019               Content Version: 12.6  © 9131-1895 [employer]. Care instructions adapted under license by [employer]. If you have questions about a medical condition or this instruction, always ask your healthcare professional. [place] [address] any warranty or liability for your use of this information.

## 2020-10-15 NOTE — PROGRESS NOTES
Visit Information    Have you changed or started any medications since your last visit including any over-the-counter medicines, vitamins, or herbal medicines? no   Are you having any side effects from any of your medications? -  no  Have you stopped taking any of your medications? Is so, why? -  no    Have you seen any other physician or provider since your last visit? No  Have you had any other diagnostic tests since your last visit? Yes - Records Obtained  Have you been seen in the emergency room and/or had an admission to a hospital since we last saw you? No  Have you had your routine dental cleaning in the past 6 months? no    Have you activated your [employer] account? If not, what are your barriers? No:     Patient Care Team:  Lanny Ramirez MD as PCP - General (Internal Medicine)    Medical History Review  Past Medical, Family, and Social History reviewed and does contribute to the patient presenting condition    Health Maintenance   Topic Date Due    HIV screen  08/13/1993    DTaP/Tdap/Td vaccine (1 - Tdap) 08/13/1997    Flu vaccine (1) 09/01/2020    Lipid screen  10/09/2025    Hepatitis A vaccine  Aged Out    Hepatitis B vaccine  Aged Out    Hib vaccine  Aged Out    Meningococcal (ACWY) vaccine  Aged Out    Pneumococcal 0-64 years Vaccine  Aged Out         [address] [facility]  [place] [address]  Dept: [phone]  Dept Fax: [phone]    Office Progress/Follow Up Note  Date of patient's visit: 10/15/2020   Patient's Name:  Tova Reed  YOB: 1980            Patient Care Team:  Lanny Ramirez MD as PCP - General (Internal Medicine)    REASON FOR VISIT: Abdominal Pain (Lower abdominal aching that radiates into lower back. Pt states he has always been regular with bowel movements but lately they have been smaller and little more harder.  Pt also states his dad just passed away with colon cancer.)        HISTORY OF PRESENT ILLNESS: History was obtained from the patient. Claudine Akhtar is a 41 y.o. is here for lower abdominal pain that radiates into his bilateral back. Pain is very mild, dull, started out worse in the morning about 2 months ago, but is now constant. It is not better or worse with food or with positions. He rates the pain as 2/10,  crampy, dull, very mild, does not interfere with daily activities. He has not tried anything for it, other than using a foam roller for his back which seems to help the back pain. He has made healthy diet changes, cut out junk food, drinks lots of water, and has lost weight. Pertinent negatives include no nausea or vomiting, no diarrhea, no fever or chills, no change in urination or dysuria. He does state that his bowel movements have changed in consistency and amount, smaller and harder than normal. His father recently  from colon cancer with mets and he is requesting GI referral.   Had lipid panel done, reviewed results with patient. Patient Active Problem List   Diagnosis    MAGDIEL (obstructive sleep apnea)    Obesity (BMI 30.0-34. 9)    Fatigue due to sleep pattern disturbance       No Known Allergies      MEDICATIONS:     No current outpatient medications on file. No current facility-administered medications for this visit. SOCIAL HISTORY    Reviewed and updated. Alesha Kwaku  reports that he has never smoked. He has never used smokeless tobacco.    FAMILY HISTORY:    Reviewed and updated. family history includes Breast Cancer (age of onset: 58) in his mother; Colon Cancer (age of onset: 59) in his father; Prostate Cancer (age of onset: 61) in his maternal uncle; Prostate Cancer (age of onset: 72) in his maternal grandfather. REVIEW OF SYSTEMS:      Review of Systems   Constitutional: Negative for chills, fatigue, fever and unexpected weight change.         Cut out all junk food  Eating salad everyday, drinks lots of water   HENT: Negative for sore throat and trouble swallowing. Respiratory: Negative for cough, shortness of breath and wheezing. Cardiovascular: Negative for chest pain, palpitations and leg swelling. Gastrointestinal: Positive for abdominal pain and constipation. Negative for blood in stool, nausea, rectal pain and vomiting. Smaller, harder, more straining  No visible blood, no dark tarry stools   Endocrine: Positive for polyuria. Negative for polydipsia and polyphagia. States he has always urinated a lot, no difference   Genitourinary: Negative for difficulty urinating, dysuria and hematuria. Musculoskeletal: Positive for back pain. Neurological: Negative for weakness and numbness. Psychiatric/Behavioral: Negative for sleep disturbance. The patient is nervous/anxious. Sleeps great with Cpap        PHYSICAL EXAM:      Vitals:    10/15/20 1221   BP: 118/76   Site: Right Upper Arm   Position: Sitting   Cuff Size: Small Adult   Pulse: 72   Resp: 16   Temp: 98 °F (36.7 °C)   Weight: 214 lb (97.1 kg)   Height: 5' 10\" (1.778 m)     BP Readings from Last 3 Encounters:   10/15/20 118/76   06/29/20 120/70   09/24/19 136/62        Physical Exam  Vitals signs reviewed. Constitutional:       General: He is not in acute distress. Appearance: He is well-developed. He is not ill-appearing. HENT:      Head: Normocephalic and atraumatic. Nose: Nose normal.      Mouth/Throat:      Mouth: Mucous membranes are moist.      Pharynx: Oropharynx is clear. No oropharyngeal exudate or posterior oropharyngeal erythema. Eyes:      General:         Right eye: No discharge. Left eye: No discharge. Conjunctiva/sclera: Conjunctivae normal.      Pupils: Pupils are equal, round, and reactive to light. Neck:      Musculoskeletal: Normal range of motion. Cardiovascular:      Rate and Rhythm: Normal rate and regular rhythm. Pulses: Normal pulses. Heart sounds: Normal heart sounds. No murmur.    Pulmonary:      Effort: Pulmonary effort is normal.      Breath sounds: Normal breath sounds. No wheezing. Abdominal:      General: Bowel sounds are normal. There is no distension. Palpations: Abdomen is soft. There is no mass. Tenderness: There is no abdominal tenderness. There is no guarding. Musculoskeletal:      Cervical back: He exhibits normal range of motion and no tenderness. Thoracic back: He exhibits normal range of motion and no tenderness. Lumbar back: He exhibits normal range of motion and no tenderness. Skin:     General: Skin is warm and dry. Findings: No rash. Neurological:      Mental Status: He is alert. Gait: Gait normal.   Psychiatric:         Mood and Affect: Mood normal.         Behavior: Behavior normal.          LABORATORY FINDINGS:    CBC:   Lab Results   Component Value Date    WBC 5.5 09/10/2018    HGB 15.7 09/10/2018     09/10/2018     BMP:   Lab Results   Component Value Date     09/10/2018    K 4.4 09/10/2018     09/10/2018    CO2 28 09/10/2018    BUN 13 09/10/2018    CREATININE 0.90 09/10/2018    GLUCOSE 100 09/10/2018     HEMOGLOBIN A1C: No results found for: LABA1C  FASTING LIPID PANEL:   Lab Results   Component Value Date    HDL 40 (L) 10/09/2020    LDLCHOLESTEROL 153 (H) 10/09/2020       ASSESSMENT AND PLAN:      Visit Diagnoses and Associated Orders     Low back pain, unspecified back pain laterality, unspecified chronicity, unspecified whether sciatica present    -  Primary    New, mild, continue regular exercise, stretching. Tylenol or motrin prn         Hyperlipidemia LDL goal <100        New, , educated patient on healthy diet, regular exercise.     TSH with Reflex [id number] Custom]   - Future Order         Screening for HIV (human immunodeficiency virus)        HIV Screen [[id number] Custom]   - Future Order         Lower abdominal pain        New, mild; check labs, UA    CBC [[id number] Custom]   - Future Order    Comprehensive Metabolic Panel [[id number] Custom]   - Future Order    Hemoglobin A1C [[id number] Custom]   - Future Order    Urinalysis [[id number] Custom]   - Future Christianne Love MD, Gastroenterology, [facility]]      Lipase [[id number] Custom]   - Future Order         Obesity (BMI 30.0-34. 9)        Weights trending down, continue healthy diet, regular exercise    TSH with Reflex [[id number] Custom]   - Future Order         Change in bowel movement        Owen Bethea MD, Gastroenterology, [facility]]           Family history of colon cancer in father        Owen Bethea MD, Gastroenterology, [place] [[id number] Custom]                  FOLLOW UP AND INSTRUCTIONS:     Return in about 1 week (around 10/22/2020) for lab review, or sooner if needed. · Howard Barrett received counseling on the following healthy behaviors: nutrition and exercise    · Discussed use, benefit, and side effects of prescribed medications. Barriers to medication compliance addressed. All patient questions answered. Pt voiced understanding. · Patient given educational materials - see patient instructions    TICO Pitts - CNP    10/15/2020, 1:06 PM    Please note that this chart was generated using voice recognition Dragon dictation software. Although every effort was made to ensure the accuracy of this automatedtranscription, some errors in transcription may have occurred.

## 2020-10-16 ENCOUNTER — HOSPITAL ENCOUNTER (OUTPATIENT)
Age: 40
Setting detail: SPECIMEN
Discharge: HOME | End: 2020-10-16
Payer: COMMERCIAL

## 2020-10-16 LAB
ALBUMIN SERPL-MCNC: 4.7 G/DL (ref 3.5–5.2)
ALBUMIN/GLOBULIN RATIO: 1.8 (ref 1–2.5)
ALP BLD-CCNC: 64 U/L (ref 40–129)
ALT SERPL-CCNC: 22 U/L (ref 5–41)
ANION GAP SERPL CALCULATED.3IONS-SCNC: 16 MMOL/L (ref 9–17)
AST SERPL-CCNC: 21 U/L
BILIRUB SERPL-MCNC: 1.64 MG/DL (ref 0.3–1.2)
BILIRUBIN URINE: NEGATIVE
BUN BLDV-MCNC: 15 MG/DL (ref 6–20)
BUN/CREAT BLD: ABNORMAL (ref 9–20)
CALCIUM SERPL-MCNC: 9.7 MG/DL (ref 8.6–10.4)
CHLORIDE BLD-SCNC: 100 MMOL/L (ref 98–107)
CO2: 24 MMOL/L (ref 20–31)
COLOR: YELLOW
COMMENT UA: NORMAL
CREAT SERPL-MCNC: 0.73 MG/DL (ref 0.7–1.2)
ESTIMATED AVERAGE GLUCOSE: 108 MG/DL
GFR AFRICAN AMERICAN: >60 ML/MIN
GFR NON-AFRICAN AMERICAN: >60 ML/MIN
GFR SERPL CREATININE-BSD FRML MDRD: ABNORMAL ML/MIN/{1.73_M2}
GFR SERPL CREATININE-BSD FRML MDRD: ABNORMAL ML/MIN/{1.73_M2}
GLUCOSE BLD-MCNC: 82 MG/DL (ref 70–99)
GLUCOSE URINE: NEGATIVE
HBA1C MFR BLD: 5.4 % (ref 4–6)
HCT VFR BLD CALC: 50.6 % (ref 40.7–50.3)
HEMOGLOBIN: 16.6 G/DL (ref 13–17)
HIV AG/AB: NONREACTIVE
KETONES, URINE: NEGATIVE
LEUKOCYTE ESTERASE, URINE: NEGATIVE
LIPASE: 43 U/L (ref 13–60)
MCH RBC QN AUTO: 30.7 PG (ref 25.2–33.5)
MCHC RBC AUTO-ENTMCNC: 32.8 G/DL (ref 28.4–34.8)
MCV RBC AUTO: 93.7 FL (ref 82.6–102.9)
NITRITE, URINE: NEGATIVE
NRBC AUTOMATED: 0 PER 100 WBC
PDW BLD-RTO: 13.2 % (ref 11.8–14.4)
PH UA: 7 (ref 5–8)
PLATELET # BLD: 215 K/UL (ref 138–453)
PMV BLD AUTO: 10.6 FL (ref 8.1–13.5)
POTASSIUM SERPL-SCNC: 4.7 MMOL/L (ref 3.7–5.3)
PROTEIN UA: NEGATIVE
RBC # BLD: 5.4 M/UL (ref 4.21–5.77)
SODIUM BLD-SCNC: 140 MMOL/L (ref 135–144)
SPECIFIC GRAVITY UA: 1.01 (ref 1–1.03)
TOTAL PROTEIN: 7.3 G/DL (ref 6.4–8.3)
TSH SERPL DL<=0.05 MIU/L-ACNC: 1.22 MIU/L (ref 0.3–5)
TURBIDITY: CLEAR
URINE HGB: NEGATIVE
UROBILINOGEN, URINE: NORMAL
WBC # BLD: 5.4 K/UL (ref 3.5–11.3)

## 2020-10-27 ENCOUNTER — OFFICE VISIT (OUTPATIENT)
Dept: INTERNAL MEDICINE CLINIC | Age: 40
End: 2020-10-27
Payer: COMMERCIAL

## 2020-10-27 VITALS
WEIGHT: 214 LBS | TEMPERATURE: 97 F | BODY MASS INDEX: 30.71 KG/M2 | SYSTOLIC BLOOD PRESSURE: 112 MMHG | HEART RATE: 73 BPM | DIASTOLIC BLOOD PRESSURE: 72 MMHG | OXYGEN SATURATION: 93 %

## 2020-10-27 PROCEDURE — 1036F TOBACCO NON-USER: CPT | Performed by: NURSE PRACTITIONER

## 2020-10-27 PROCEDURE — 99213 OFFICE O/P EST LOW 20 MIN: CPT | Performed by: NURSE PRACTITIONER

## 2020-10-27 PROCEDURE — G8417 CALC BMI ABV UP PARAM F/U: HCPCS | Performed by: NURSE PRACTITIONER

## 2020-10-27 PROCEDURE — G8427 DOCREV CUR MEDS BY ELIG CLIN: HCPCS | Performed by: NURSE PRACTITIONER

## 2020-10-27 PROCEDURE — G8484 FLU IMMUNIZE NO ADMIN: HCPCS | Performed by: NURSE PRACTITIONER

## 2020-10-27 ASSESSMENT — ENCOUNTER SYMPTOMS
SHORTNESS OF BREATH: 0
ABDOMINAL PAIN: 0
NAUSEA: 0
CONSTIPATION: 0
BACK PAIN: 0
COUGH: 0
WHEEZING: 0

## 2020-10-27 ASSESSMENT — PATIENT HEALTH QUESTIONNAIRE - PHQ9
SUM OF ALL RESPONSES TO PHQ QUESTIONS 1-9: 0
SUM OF ALL RESPONSES TO PHQ QUESTIONS 1-9: 0
1. LITTLE INTEREST OR PLEASURE IN DOING THINGS: 0
SUM OF ALL RESPONSES TO PHQ9 QUESTIONS 1 & 2: 0
2. FEELING DOWN, DEPRESSED OR HOPELESS: 0
SUM OF ALL RESPONSES TO PHQ QUESTIONS 1-9: 0

## 2020-10-27 NOTE — PROGRESS NOTES
Visit Information    Have you changed or started any medications since your last visit including any over-the-counter medicines, vitamins, or herbal medicines? no   Are you having any side effects from any of your medications? -  no  Have you stopped taking any of your medications? Is so, why? -  no    Have you seen any other physician or provider since your last visit? No  Have you had any other diagnostic tests since your last visit? Yes - Records Obtained  Have you been seen in the emergency room and/or had an admission to a hospital since we last saw you? No  Have you had your routine dental cleaning in the past 6 months? yes -     Have you activated your [employer] account? If not, what are your barriers? No:      Patient Care Team:  Gayathri Hillman MD as PCP - General (Internal Medicine)    Medical History Review  Past Medical, Family, and Social History reviewed and does contribute to the patient presenting condition    Health Maintenance   Topic Date Due    DTaP/Tdap/Td vaccine (1 - Tdap) 08/13/1997    Flu vaccine (1) 10/27/2021 (Originally 9/1/2020)    Lipid screen  10/09/2025    HIV screen  Completed    Hepatitis A vaccine  Aged Out    Hepatitis B vaccine  Aged Out    Hib vaccine  Aged Out    Meningococcal (ACWY) vaccine  Aged Out    Pneumococcal 0-64 years Vaccine  Aged Out         [address] [address]  Dept: [phone]  Dept Fax: [phone]    Office Progress/Follow Up Note  Date of patient's visit: 10/27/2020   Patient's Name:  Kashmir Schneider  YOB: 1980            Patient Care Team:  Gayathri Hillman MD as PCP - General (Internal Medicine)    REASON FOR VISIT: Follow-up and Discuss Labs        HISTORY OF PRESENT ILLNESS:      History was obtained from the patient. Kashmir Schneider is a 41 y.o. is here for lab review after being seen for mild lower abdominal pain radiating to back.   He reports that he started taking fiber tabs and this has improved/resolved. All labs reviewed with patient, unremarkable except for elevated bilirubin. Patient states that he has had elevated  Bilirubin since he had a bad case of mono as a teen. Denies alcohol use. He would like to cancel GI referral since his symptoms have improved. Patient Active Problem List   Diagnosis    MAGDIEL (obstructive sleep apnea)    Obesity (BMI 30.0-34. 9)    Fatigue due to sleep pattern disturbance       No Known Allergies      MEDICATIONS:     Current Outpatient Medications   Medication Sig Dispense Refill    FIBER COMPLETE PO Take 2 tablets by mouth daily       No current facility-administered medications for this visit. SOCIAL HISTORY    Reviewed and updated. Carmenza Spears  reports that he has never smoked. He has never used smokeless tobacco.    FAMILY HISTORY:    Reviewed and updated. family history includes Breast Cancer (age of onset: 58) in his mother; Colon Cancer (age of onset: 59) in his father; Prostate Cancer (age of onset: 61) in his maternal uncle; Prostate Cancer (age of onset: 72) in his maternal grandfather. REVIEW OF SYSTEMS:      Review of Systems   Constitutional: Negative for chills, fatigue and fever. Eyes: Negative for visual disturbance. Respiratory: Negative for cough, shortness of breath and wheezing. Cardiovascular: Negative for chest pain, palpitations and leg swelling. Gastrointestinal: Negative for abdominal pain, constipation and nausea. Musculoskeletal: Negative for back pain. Psychiatric/Behavioral: The patient is nervous/anxious. All other systems reviewed and are negative. PHYSICAL EXAM:      Vitals:    10/27/20 0942   BP: 112/72   Site: Left Upper Arm   Pulse: 73   Temp: 97 °F (36.1 °C)   SpO2: 93%   Weight: 214 lb (97.1 kg)     BP Readings from Last 3 Encounters:   10/27/20 112/72   10/15/20 118/76   06/29/20 120/70        Physical Exam  Vitals signs reviewed.    Constitutional:       General: He is not in acute distress. Appearance: He is well-developed. Eyes:      General: No scleral icterus. Conjunctiva/sclera: Conjunctivae normal.   Cardiovascular:      Rate and Rhythm: Normal rate and regular rhythm. Heart sounds: Normal heart sounds. No murmur. Pulmonary:      Effort: Pulmonary effort is normal.      Breath sounds: Normal breath sounds. No wheezing. Abdominal:      General: Bowel sounds are normal. There is no distension. Palpations: Abdomen is soft. Tenderness: There is no abdominal tenderness. Skin:     General: Skin is warm and dry. Coloration: Skin is not jaundiced. Psychiatric:         Mood and Affect: Mood normal.         Behavior: Behavior normal.          LABORATORY FINDINGS:    CBC:   Lab Results   Component Value Date    WBC 5.4 10/16/2020    HGB 16.6 10/16/2020     10/16/2020     BMP:   Lab Results   Component Value Date     10/16/2020    K 4.7 10/16/2020     10/16/2020    CO2 24 10/16/2020    BUN 15 10/16/2020    CREATININE 0.73 10/16/2020    GLUCOSE 82 10/16/2020     HEMOGLOBIN A1C:   Lab Results   Component Value Date    LABA1C 5.4 10/16/2020     FASTING LIPID PANEL:   Lab Results   Component Value Date    HDL 40 (L) 10/09/2020    LDLCHOLESTEROL 153 (H) 10/09/2020       ASSESSMENT AND PLAN:      Visit Diagnoses and Associated Orders     Elevated bilirubin    -  Primary    Persistent for years, check hepatic panel. Hepatic Function Panel [id number] Custom]   - Future Order         Lower abdominal pain        Improved with fiber tabs. FIBER COMPLETE PO [[id number]]                  FOLLOW UP AND INSTRUCTIONS:     Return if symptoms worsen or fail to improve. · Crissy Ramona received counseling on the following healthy behaviors: nutrition and exercise    · Discussed use, benefit, and side effects of prescribed medications. Barriers to medication compliance addressed. All patient questions answered. Pt voiced understanding.        Juliette Kelly APRN - CNP    10/28/2020, 8:21 AM    Please note that this chart was generated using voice recognition Dragon dictation software. Although every effort was made to ensure the accuracy of this automatedtranscription, some errors in transcription may have occurred.

## 2020-10-30 ENCOUNTER — HOSPITAL ENCOUNTER (OUTPATIENT)
Age: 40
Setting detail: SPECIMEN
Discharge: HOME | End: 2020-10-30
Payer: COMMERCIAL

## 2020-10-30 LAB
ALBUMIN SERPL-MCNC: 4.4 G/DL (ref 3.5–5.2)
ALBUMIN/GLOBULIN RATIO: 2 (ref 1–2.5)
ALP BLD-CCNC: 65 U/L (ref 40–129)
ALT SERPL-CCNC: 24 U/L (ref 5–41)
AST SERPL-CCNC: 23 U/L
BILIRUB SERPL-MCNC: 1.64 MG/DL (ref 0.3–1.2)
BILIRUBIN DIRECT: 0.24 MG/DL
BILIRUBIN, INDIRECT: 1.4 MG/DL (ref 0–1)
GLOBULIN: ABNORMAL G/DL (ref 1.5–3.8)
TOTAL PROTEIN: 6.6 G/DL (ref 6.4–8.3)

## 2020-11-04 ENCOUNTER — VIRTUAL VISIT (OUTPATIENT)
Dept: INTERNAL MEDICINE CLINIC | Age: 40
End: 2020-11-04
Payer: COMMERCIAL

## 2020-11-04 PROCEDURE — 99442 PR PHYS/QHP TELEPHONE EVALUATION 11-20 MIN: CPT | Performed by: PHYSICIAN ASSISTANT

## 2020-11-04 RX ORDER — AMOXICILLIN AND CLAVULANATE POTASSIUM 875; 125 MG/1; MG/1
1 TABLET, FILM COATED ORAL 2 TIMES DAILY
Qty: 14 TABLET | Refills: 0 | Status: SHIPPED | OUTPATIENT
Start: 2020-11-04 | End: 2020-11-11

## 2020-11-04 ASSESSMENT — PATIENT HEALTH QUESTIONNAIRE - PHQ9
SUM OF ALL RESPONSES TO PHQ QUESTIONS 1-9: 0
SUM OF ALL RESPONSES TO PHQ9 QUESTIONS 1 & 2: 0
1. LITTLE INTEREST OR PLEASURE IN DOING THINGS: 0
2. FEELING DOWN, DEPRESSED OR HOPELESS: 0
SUM OF ALL RESPONSES TO PHQ QUESTIONS 1-9: 0
SUM OF ALL RESPONSES TO PHQ QUESTIONS 1-9: 0

## 2020-11-04 ASSESSMENT — ENCOUNTER SYMPTOMS
SORE THROAT: 0
EYE PAIN: 0
EYE REDNESS: 0
SHORTNESS OF BREATH: 0
COLOR CHANGE: 0
VOMITING: 0
SINUS PAIN: 0
RHINORRHEA: 0
COUGH: 0
TROUBLE SWALLOWING: 0
VOICE CHANGE: 0
NAUSEA: 0
SINUS PRESSURE: 1
CHEST TIGHTNESS: 0
ABDOMINAL PAIN: 0

## 2020-11-04 NOTE — PROGRESS NOTES
[address] [address]  Dept: [phone]  Dept Fax: [phone]    Virtual Visit Progress Note  Date of patient's visit: 11/4/2020  Patient's Name:  Jacob East YOB: 1980            Patient Care Team:  Iam Velasquez MD as PCP - General (Internal Medicine)    The patient and/or health care decision maker are aware that the patient may receive a bill for this telephone service, depending on her/his insurance coverage, and provided verbal consent to proceed: Yes    REASON FOR VISIT:  Same day visit    HISTORY OF PRESENT ILLNESS:      Chief Complaint   Patient presents with    Fever    Generalized Body Aches    Sinus Problem     pressure     History was obtained from the patient. Jacob East is a 41 y.o. male here today virtually for above, not able to access virtual device. Patient complains of sinus pressure, congestion, headache, muscle aches, fever. Symptoms reportedly started two days ago. He reports worse symptoms is sinus pressure. No cough, chest pain, dyspnea, sore throat, loss of taste or smell. No neck pain/stiffness. Fever with tmax 100.2 F. He reports history of sinusitis in the past with similar symptoms. He denies sick contacts. Patient Active Problem List   Diagnosis    MAGDIEL (obstructive sleep apnea)    Obesity (BMI 30.0-34. 9)    Fatigue due to sleep pattern disturbance     MEDICATIONS:      Current Outpatient Medications   Medication Sig Dispense Refill    amoxicillin-clavulanate (AUGMENTIN) 875-125 MG per tablet Take 1 tablet by mouth 2 times daily for 7 days 14 tablet 0    FIBER COMPLETE PO Take 2 tablets by mouth daily       No current facility-administered medications for this visit. ALLERGIES:    No Known Allergies    SOCIAL HISTORY   Reviewed and no change from previous record. Mariya Palm  reports that he has never smoked.  He has never used smokeless tobacco.    REVIEW OF SYSTEMS: Review of Systems   Constitutional: Positive for chills and fever. Negative for diaphoresis and fatigue. HENT: Positive for congestion and sinus pressure. Negative for drooling, ear discharge, ear pain, postnasal drip, rhinorrhea, sinus pain, sore throat, trouble swallowing and voice change. Eyes: Negative for pain and redness. Respiratory: Negative for cough, chest tightness and shortness of breath. Cardiovascular: Negative for chest pain and leg swelling. Gastrointestinal: Negative for abdominal pain, nausea and vomiting. Musculoskeletal: Positive for myalgias. Negative for neck pain and neck stiffness. Skin: Negative for color change and rash. Neurological: Positive for headaches. Negative for dizziness and light-headedness. PHYSICAL EXAM:      Patient-Reported Vitals 11/4/2020   Patient-Reported Weight 210lb   Patient-Reported Height 5 10   Patient-Reported Temperature 100.2      Exam was very limited due to virtual encounter, patient sounds well, in no distress, speaking in complete sentences, sounds mildly congested, no cough. ASSESSMENT AND PLAN:       Diagnosis Orders   1. Sinus pressure  COVID-19 Ambulatory    POCT Influenza A/B   2. Fever and chills  COVID-19 Ambulatory    POCT Influenza A/B   3. Muscular aches  COVID-19 Ambulatory    POCT Influenza A/B     Suspect acute sinusitis vs viral syndrome, start Augmentin, will rule out influenza/covid-19 infection, remain home until cleared to return to work, supportive care, tylenol prn pain/fever, monitor symptoms, prompt return to clinic or hospital for new or worsening symptoms    FOLLOW UP AND INSTRUCTIONS:   Return if symptoms worsen or fail to improve. Discussed use, benefit, and side effects of prescribed medications. All patient questions answered. Patient voiced understanding.      Patient given educational materials - see patient instructions    Patient being evaluated by a Virtual Visit (video visit) encounter to address concerns as mentioned above. A caregiver was present when appropriate. Due to this being a TeleHealth encounter (During VMDRL-71 public health emergency), evaluation of the following organ systems was limited: Vitals/Constitutional/EENT/Resp/CV/GI//MS/Neuro/Skin/Heme-Lymph-Imm. Pursuant to the emergency declaration under the [address] [place] [address], [address] [place] [address] authority and the [employer] Act, this Virtual Visit was conducted with patient's (and/or legal guardian's) consent, to reduce the patient's risk of exposure to COVID-19 and provide necessary medical care. The patient (and/or legal guardian) has also been advised to contact this office for worsening conditions or problems, and seek emergency medical treatment and/or call 911 if deemed necessary. Total time spent on encounter: 12 mins     Services were provided through a video synchronous discussion virtually to substitute for in-person clinic visit. Patient and provider were located at their individual homes. [facility]  11/4/2020, 12:06 PM    Please note that this chart wasgenerated using voice recognition Dragon dictation software. Although every effort was made to ensure the accuracy of this automated transcription, some errors in transcription may have occurred.

## 2020-11-04 NOTE — PROGRESS NOTES
Visit Information    Have you changed or started any medications since your last visit including any over-the-counter medicines, vitamins, or herbal medicines? no   Are you having any side effects from any of your medications? -  no  Have you stopped taking any of your medications? Is so, why? -  no    Have you seen any other physician or provider since your last visit? No  Have you had any other diagnostic tests since your last visit? No  Have you been seen in the emergency room and/or had an admission to a hospital since we last saw you? No  Have you had your routine dental cleaning in the past 6 months? yes -     Have you activated your [employer] account? If not, what are your barriers?  No:      Patient Care Team:  Katherin Yanez MD as PCP - General (Internal Medicine)    Medical History Review  Past Medical, Family, and Social History reviewed and does not contribute to the patient presenting condition    Health Maintenance   Topic Date Due    DTaP/Tdap/Td vaccine (1 - Tdap) 08/13/1997    Flu vaccine (1) 10/27/2021 (Originally 9/1/2020)    Lipid screen  10/09/2025    HIV screen  Completed    Hepatitis A vaccine  Aged Out    Hepatitis B vaccine  Aged Out    Hib vaccine  Aged Out    Meningococcal (ACWY) vaccine  Aged Out    Pneumococcal 0-64 years Vaccine  Aged Out

## 2020-11-06 ENCOUNTER — HOSPITAL ENCOUNTER (OUTPATIENT)
Age: 40
Setting detail: SPECIMEN
Discharge: HOME | End: 2020-11-06
Payer: COMMERCIAL

## 2020-11-06 ENCOUNTER — OFFICE VISIT (OUTPATIENT)
Dept: PRIMARY CARE CLINIC | Age: 40
End: 2020-11-06
Payer: COMMERCIAL

## 2020-11-06 PROCEDURE — G8417 CALC BMI ABV UP PARAM F/U: HCPCS | Performed by: FAMILY MEDICINE

## 2020-11-06 PROCEDURE — G8428 CUR MEDS NOT DOCUMENT: HCPCS | Performed by: FAMILY MEDICINE

## 2020-11-06 PROCEDURE — 99211 OFF/OP EST MAY X REQ PHY/QHP: CPT | Performed by: FAMILY MEDICINE

## 2020-11-08 LAB — SARS-COV-2, NAA: DETECTED

## 2020-11-09 ENCOUNTER — TELEPHONE (OUTPATIENT)
Dept: ADMINISTRATIVE | Age: 40
End: 2020-11-09

## 2020-11-18 ENCOUNTER — TELEPHONE (OUTPATIENT)
Dept: INTERNAL MEDICINE CLINIC | Age: 40
End: 2020-11-18

## 2020-11-18 NOTE — TELEPHONE ENCOUNTER
Patient Latrell Vasquez stopped in office to ask if you would give him a  Letter stating it is safe for him to have his kids on his visitation Days his ex wife is concerned. he tested positive for Covid on 11/06/2020  Patient went back to work on Thursday 11/12/2020. He has not had any kind of symptoms since 11/11/2020. He said he is feeling great.

## 2020-11-18 NOTE — TELEPHONE ENCOUNTER
Can provide note stating symptom onset more than 14 days ago, if symptoms resolved and no fever safe to be around immediate family, recommend continue standard precautions, social distancing, masks, hand washing, et.

## 2021-01-22 ENCOUNTER — NURSE TRIAGE (OUTPATIENT)
Dept: OTHER | Facility: CLINIC | Age: 41
End: 2021-01-22

## 2021-01-22 NOTE — TELEPHONE ENCOUNTER
Hurt leg at work given number to call for this [place]. around belt area of Abdominal pain and back pain more of flanks. , a few months ago it started, more consistent. Dull aching. Pain 2/10. Reason for Disposition   Abdominal pain is a chronic symptom (recurrent or ongoing AND lasting > 4 weeks)    Answer Assessment - Initial Assessment Questions  1. LOCATION: \"Where does it hurt? \"         See above    2. RADIATION: \"Does the pain shoot anywhere else? \" (e.g., chest, back)        See above    3. ONSET: \"When did the pain begin? \" (Minutes, hours or days ago)         See above    4. SUDDEN: \"Gradual or sudden onset? \"        Gradual    5. PATTERN \"Does the pain come and go, or is it constant? \"     - If constant: \"Is it getting better, staying the same, or worsening? \"       (Note: Constant means the pain never goes away completely; most serious pain is constant and it progresses)      - If intermittent: \"How long does it last?\" \"Do you have pain now? \"      (Note: Intermittent means the pain goes away completely between bouts)        See above    6. SEVERITY: \"How bad is the pain? \"  (e.g., Scale 1-10; mild, moderate, or severe)     - MILD (1-3): doesn't interfere with normal activities, abdomen soft and not tender to touch      - MODERATE (4-7): interferes with normal activities or awakens from sleep, tender to touch      - SEVERE (8-10): excruciating pain, doubled over, unable to do any normal activities          See above    7. RECURRENT SYMPTOM: \"Have you ever had this type of abdominal pain before? \" If so, ask: \"When was the last time? \" and \"What happened that time? \"         No    8. CAUSE: \"What do you think is causing the abdominal pain? \"        Unsure    9. RELIEVING/AGGRAVATING FACTORS: \"What makes it better or worse? \" (e.g., movement, antacids, bowel movement)        Sitting around makes it worse    10. OTHER SYMPTOMS: \"Has there been any vomiting, diarrhea, constipation, or urine problems? \" No    Protocols used: ABDOMINAL PAIN - MALE-ADULT-[place]    Caller provided care advice and instructed to call back with worsening symptoms. Attention Provider: Thank you for allowing me to participate in the care of your patient. The patient was connected to triage in response to information provided to the [facility]. Please do not respond through this encounter as the response is not directed to a shared pool. Warm transfer to [facility] at [facility].

## 2021-06-04 ENCOUNTER — TELEPHONE (OUTPATIENT)
Dept: INTERNAL MEDICINE CLINIC | Age: 41
End: 2021-06-04

## 2021-06-04 ENCOUNTER — NURSE TRIAGE (OUTPATIENT)
Dept: OTHER | Facility: CLINIC | Age: 41
End: 2021-06-04

## 2021-06-04 NOTE — TELEPHONE ENCOUNTER
Patient was transferred to me from [facility] for abdominal pain and upper back pain to be seen within one week. Patient states he will be out of town next week and scheduled for the following week.     Last OV 11/4/20  Next OV 6/14/21

## 2021-06-04 NOTE — TELEPHONE ENCOUNTER
Reason for Disposition   Constant abdominal pain lasting > 2 hours    Answer Assessment - Initial Assessment Questions  1. LOCATION: \"Where does it hurt? \"        Right in the middle, at the belt line . And upper back pain present too which is more sharp. 2. RADIATION: \"Does the pain shoot anywhere else? \" (e.g., chest, back)      No chest pain . 3. ONSET: \"When did the pain begin? \" (Minutes, hours or days ago)       A few months ago    4. SUDDEN: \"Gradual or sudden onset? \"      Gradual -was intermittent, now constant \"pinching\" and \"dull\"    5. PATTERN \"Does the pain come and go, or is it constant? \"     - If constant: \"Is it getting better, staying the same, or worsening? \"       (Note: Constant means the pain never goes away completely; most serious pain is constant and it progresses)      - If intermittent: \"How long does it last?\" \"Do you have pain now? \"      (Note: Intermittent means the pain goes away completely between bouts)      Constant for 2 or 3 weeks now    6. SEVERITY: \"How bad is the pain? \"  (e.g., Scale 1-10; mild, moderate, or severe)     - MILD (1-3): doesn't interfere with normal activities, abdomen soft and not tender to touch      - MODERATE (4-7): interferes with normal activities or awakens from sleep, tender to touch      - SEVERE (8-10): excruciating pain, doubled over, unable to do any normal activities        Maybe a 3/10    7. RECURRENT SYMPTOM: \"Have you ever had this type of abdominal pain before? \" If so, ask: \"When was the last time? \" and \"What happened that time? \"       \"Once in a blue moon\" occasional ache or pain - was from eating a lot of sunflower seeds    8. CAUSE: \"What do you think is causing the abdominal pain? \"      Unsure    9. RELIEVING/AGGRAVATING FACTORS: \"What makes it better or worse? \" (e.g., movement, antacids, bowel movement)      Sitting still makes better, twisting makes it worse      10.  OTHER SYMPTOMS: \"Has there been any vomiting, diarrhea, constipation, or urine problems? \"        Denies    Protocols used: ABDOMINAL PAIN - MALE-ADULT-[place]    Received call from Roberto at [facility]) [place] with [employer]. Brief description of triage: see above    Triage indicates for patient to ER/UC/office with pcp approval .  [facility] [place] [facility] and completed 2nd level triage with Adrian Morrison NP who recommends apt next week or he can go to [facility] if he prefers or he worsens - pt aware and agreeable. Care advice provided, patient verbalizes understanding; denies any other questions or concerns; instructed to call back for any new or worsening symptoms. Writer provided warm transfer to Landen Ewing at [facility] [place] for appointment scheduling. Attention Provider: Thank you for allowing me to participate in the care of your patient. The patient was connected to triage in response to information provided to the [facility]. Please do not respond through this encounter as the response is not directed to a shared pool.

## 2021-06-04 NOTE — TELEPHONE ENCOUNTER
Patient advised to report to the ER or call the office before the appointment if needed for increased pain. Patient verbalized understanding and agreed to plan.

## 2021-06-14 ENCOUNTER — OFFICE VISIT (OUTPATIENT)
Dept: INTERNAL MEDICINE CLINIC | Age: 41
End: 2021-06-14
Payer: COMMERCIAL

## 2021-06-14 VITALS
HEART RATE: 74 BPM | BODY MASS INDEX: 31.5 KG/M2 | SYSTOLIC BLOOD PRESSURE: 118 MMHG | WEIGHT: 220 LBS | DIASTOLIC BLOOD PRESSURE: 78 MMHG | OXYGEN SATURATION: 97 % | HEIGHT: 70 IN

## 2021-06-14 DIAGNOSIS — Z80.0 FAMILY HISTORY OF COLON CANCER: ICD-10-CM

## 2021-06-14 DIAGNOSIS — Z87.39 HISTORY OF BACK PAIN: ICD-10-CM

## 2021-06-14 DIAGNOSIS — R10.30 LOWER ABDOMINAL PAIN: Primary | ICD-10-CM

## 2021-06-14 PROCEDURE — G8427 DOCREV CUR MEDS BY ELIG CLIN: HCPCS | Performed by: NURSE PRACTITIONER

## 2021-06-14 PROCEDURE — G8417 CALC BMI ABV UP PARAM F/U: HCPCS | Performed by: NURSE PRACTITIONER

## 2021-06-14 PROCEDURE — 1036F TOBACCO NON-USER: CPT | Performed by: NURSE PRACTITIONER

## 2021-06-14 PROCEDURE — 99213 OFFICE O/P EST LOW 20 MIN: CPT | Performed by: NURSE PRACTITIONER

## 2021-06-14 SDOH — ECONOMIC STABILITY: FOOD INSECURITY: WITHIN THE PAST 12 MONTHS, THE FOOD YOU BOUGHT JUST DIDN'T LAST AND YOU DIDN'T HAVE MONEY TO GET MORE.: NEVER TRUE

## 2021-06-14 SDOH — ECONOMIC STABILITY: TRANSPORTATION INSECURITY
IN THE PAST 12 MONTHS, HAS LACK OF TRANSPORTATION KEPT YOU FROM MEETINGS, WORK, OR FROM GETTING THINGS NEEDED FOR DAILY LIVING?: NO

## 2021-06-14 SDOH — ECONOMIC STABILITY: FOOD INSECURITY: WITHIN THE PAST 12 MONTHS, YOU WORRIED THAT YOUR FOOD WOULD RUN OUT BEFORE YOU GOT MONEY TO BUY MORE.: NEVER TRUE

## 2021-06-14 SDOH — ECONOMIC STABILITY: TRANSPORTATION INSECURITY
IN THE PAST 12 MONTHS, HAS THE LACK OF TRANSPORTATION KEPT YOU FROM MEDICAL APPOINTMENTS OR FROM GETTING MEDICATIONS?: NO

## 2021-06-14 ASSESSMENT — ENCOUNTER SYMPTOMS
CHEST TIGHTNESS: 0
RHINORRHEA: 0
SINUS PAIN: 0
VOMITING: 0
NAUSEA: 0
FLATUS: 1
COUGH: 0
BLOOD IN STOOL: 0
ABDOMINAL PAIN: 1
TROUBLE SWALLOWING: 0
BACK PAIN: 1
HEMATOCHEZIA: 0
BELCHING: 0
SHORTNESS OF BREATH: 0
CONSTIPATION: 0
BOWEL INCONTINENCE: 0
SORE THROAT: 0
WHEEZING: 0
DIARRHEA: 0

## 2021-06-14 ASSESSMENT — PATIENT HEALTH QUESTIONNAIRE - PHQ9
1. LITTLE INTEREST OR PLEASURE IN DOING THINGS: 0
SUM OF ALL RESPONSES TO PHQ9 QUESTIONS 1 & 2: 0
SUM OF ALL RESPONSES TO PHQ QUESTIONS 1-9: 0
SUM OF ALL RESPONSES TO PHQ QUESTIONS 1-9: 0
2. FEELING DOWN, DEPRESSED OR HOPELESS: 0
SUM OF ALL RESPONSES TO PHQ QUESTIONS 1-9: 0

## 2021-06-14 ASSESSMENT — SOCIAL DETERMINANTS OF HEALTH (SDOH): HOW HARD IS IT FOR YOU TO PAY FOR THE VERY BASICS LIKE FOOD, HOUSING, MEDICAL CARE, AND HEATING?: NOT HARD AT ALL

## 2021-06-14 ASSESSMENT — CROHNS DISEASE ACTIVITY INDEX (CDAI): CDAI SCORE: 0

## 2021-06-14 NOTE — PROGRESS NOTES
Visit Information    Have you changed or started any medications since your last visit including any over-the-counter medicines, vitamins, or herbal medicines? no   Are you having any side effects from any of your medications? -  no  Have you stopped taking any of your medications? Is so, why? -  no    Have you seen any other physician or provider since your last visit? No  Have you had any other diagnostic tests since your last visit? Yes - Records Obtained  Have you been seen in the emergency room and/or had an admission to a hospital since we last saw you? No  Have you had your routine dental cleaning in the past 6 months? yes -     Have you activated your [employer] account? If not, what are your barriers? No:      Patient Care Team:  Desirae Mims MD as PCP - General (Internal Medicine)    Medical History Review  Past Medical, Family, and Social History reviewed and does not contribute to the patient presenting condition    Health Maintenance   Topic Date Due    COVID-19 Vaccine (1) Never done    DTaP/Tdap/Td vaccine (1 - Tdap) Never done    Flu vaccine (Season Ended) 10/27/2021 (Originally 9/1/2021)    Lipid screen  10/09/2025    Hepatitis C screen  Completed    HIV screen  Completed    Hepatitis A vaccine  Aged Out    Hepatitis B vaccine  Aged Out    Hib vaccine  Aged Out    Meningococcal (ACWY) vaccine  Aged Out    Pneumococcal 0-64 years Vaccine  Aged Out         [address] [address]  Dept: [phone]  Dept Fax: [phone]    OfficeProgress/Follow Up Note  Date of patient's visit: 6/14/2021  Patient's Name:  Yusuf Dickerson YOB: 1980            Patient Care Team:  Desirae Mims MD as PCP - General (Internal Medicine)    REASON FOR VISIT:  Routine outpatient follow up/Same day visit/Post hospital/    HISTORY OF PRESENT ILLNESS:        History was obtained from the patient.      Yusuf Dickerson is a 41 y.o. male here today for Abdominal Pain (dull aching pain, on and off for a few months. pt does have family hx of colon cancer) and Back Pain (upper back pain, onging for about few months, )    Abdominal Pain  This is a recurrent problem. The current episode started more than 1 month ago. The onset quality is sudden. The problem occurs constantly (when he has the pain, denies pain at this time). The problem has been resolved. The pain is located in the suprapubic region, LLQ and RLQ. The pain is mild. The quality of the pain is dull. Associated symptoms include flatus and weight loss (intentional, adjusted diet lost 25 lb). Pertinent negatives include no anorexia, arthralgias, belching, constipation, diarrhea, dysuria, fever, frequency, headaches, hematochezia, hematuria, melena, myalgias, nausea or vomiting. The pain is relieved by being still. He has tried nothing for the symptoms. His past medical history is significant for abdominal surgery (appi and hernia). There is no history of colon cancer (dad passed from colon cancer 14 months ago), Crohn's disease, gallstones, GERD, irritable bowel syndrome, pancreatitis, PUD or ulcerative colitis. Back Pain  This is a recurrent problem. The current episode started more than 1 month ago. The problem occurs intermittently. The problem has been resolved since onset. The pain is present in the thoracic spine (right ribs/side). The quality of the pain is described as aching. The pain does not radiate. The pain is at a severity of 2/10. The pain is mild. The symptoms are aggravated by position and twisting. Stiffness is present at night (laying down and getting out of bed). Associated symptoms include abdominal pain, tingling (top of R foot for work injury) and weight loss (intentional, adjusted diet lost 25 lb).  Pertinent negatives include no bladder incontinence, bowel incontinence, chest pain, dysuria, fever, headaches, leg pain, numbness, paresis, paresthesias, pelvic pain, perianal numbness or weakness. Risk factors include history of cancer, history of osteoporosis, history of steroid use, lack of exercise and recent trauma. He has tried heat for the symptoms. The treatment provided mild relief. Patient Active Problem List   Diagnosis    MAGDIEL (obstructive sleep apnea)    Obesity (BMI 30.0-34. 9)    Fatigue due to sleep pattern disturbance       Health Maintenance Due   Topic Date Due    COVID-19 Vaccine (1) Never done    DTaP/Tdap/Td vaccine (1 - Tdap) Never done       MEDICATIONS:      Current Outpatient Medications   Medication Sig Dispense Refill    FIBER COMPLETE PO Take 2 tablets by mouth daily       No current facility-administered medications for this visit. ALLERGIES:    No Known Allergies      SOCIAL HISTORY    Reviewed and no change from previous record. Peyton Allison  reports that he has never smoked. He has never used smokeless tobacco.    FAMILY HISTORY:    Reviewed and No change from previous visit    REVIEW OF SYSTEMS:    Review of Systems   Constitutional: Positive for weight loss (intentional, adjusted diet lost 25 lb). Negative for appetite change, diaphoresis, fatigue, fever and unexpected weight change. HENT: Negative for ear pain, postnasal drip, rhinorrhea, sinus pain, sore throat and trouble swallowing. Eyes: Negative for visual disturbance. Respiratory: Negative for cough, chest tightness, shortness of breath and wheezing. Cardiovascular: Negative for chest pain, palpitations and leg swelling. Gastrointestinal: Positive for abdominal pain and flatus. Negative for anorexia, blood in stool, bowel incontinence, constipation, diarrhea, hematochezia, melena, nausea and vomiting. Endocrine: Negative for polydipsia, polyphagia and polyuria. Genitourinary: Negative for bladder incontinence, difficulty urinating, dysuria, flank pain, frequency, hematuria and pelvic pain. Musculoskeletal: Positive for back pain.  Negative for arthralgias and myalgias. Skin: Negative for rash and wound. Neurological: Positive for tingling (top of R foot for work injury). Negative for dizziness, syncope, weakness, numbness, headaches and paresthesias. All other systems reviewed and are negative. PHYSICAL EXAM:      Vitals:    06/14/21 1233   BP: 118/78   Pulse: 74   SpO2: 97%   Weight: 220 lb (99.8 kg)   Height: 5' 10\" (1.778 m)     BP Readings from Last 3 Encounters:   06/14/21 118/78   10/27/20 112/72   10/15/20 118/76      Wt Readings from Last 3 Encounters:   06/14/21 220 lb (99.8 kg)   10/27/20 214 lb (97.1 kg)   10/15/20 214 lb (97.1 kg)       Physical Exam  Vitals reviewed. Constitutional:       Appearance: Normal appearance. HENT:      Head: Normocephalic. Cardiovascular:      Rate and Rhythm: Normal rate and regular rhythm. Pulses: Normal pulses. Heart sounds: Normal heart sounds. Pulmonary:      Effort: Pulmonary effort is normal.      Breath sounds: Normal breath sounds. Abdominal:      General: Bowel sounds are normal. There is no distension. Palpations: Abdomen is soft. There is no mass. Tenderness: There is no abdominal tenderness. There is no guarding or rebound. Hernia: No hernia is present. Musculoskeletal:         General: No swelling, tenderness or deformity. Normal range of motion. Skin:     General: Skin is warm and dry. Neurological:      General: No focal deficit present. Mental Status: He is alert and oriented to person, place, and time. Psychiatric:         Mood and Affect: Mood normal.         Behavior: Behavior normal.         Thought Content:  Thought content normal.         Judgment: Judgment normal.          LABORATORY FINDINGS:    CBC:  Lab Results   Component Value Date    WBC 5.4 10/16/2020    HGB 16.6 10/16/2020     10/16/2020       BMP:    Lab Results   Component Value Date     10/16/2020    K 4.7 10/16/2020     10/16/2020    CO2 24 10/16/2020    BUN 15 10/16/2020    CREATININE 0.73 10/16/2020    GLUCOSE 82 10/16/2020       HEMOGLOBIN A1C:   Lab Results   Component Value Date    LABA1C 5.4 10/16/2020       FASTING LIPID PANEL:  Lab Results   Component Value Date    HDL 40 (L) 10/09/2020       ASSESSMENT AND PLAN:      1. Lower abdominal pain  - educated on the importance of maintaining adequate water intake and maintaining adequate fiber intake. 2. Family history of colon cancer  - father diagnosed with CRC in 62s. Patient reports having colonoscopy 8 years ago for evaluation of abdominal pain. Denies h/o of Nelli Lozano MD, Gastroenterology, [place]    3. History of back pain  - Denies back pain at this time, states he previously had back pain last month and the pain was intermittent over the past couple months. - States when he has pain it is located in the right thoracic back  - Denies supportive care measures other than heat. Patient encouraged to do supportive care measures (streches, heat, Motrin/Tylenol) for pain management. Probable muscular strain      FOLLOW UP AND INSTRUCTIONS:   Return if symptoms worsen or fail to improve. Emil Tomy received counseling on the following healthy behaviors: nutrition, exercise and medication adherence    Discussed use, benefit, and side effects of prescribed medications. Barriers to medication compliance addressed. All patient questions answered. Patient voiced understanding. Patient given educational materials - see patient instructions    TICO Nelson - CNP   [facility]  6/14/2021, 1:08 PM    Please note that this chart was generated using voice recognition Dragon dictation software. Although everyeffort was made to ensure the accuracy of this automated transcription, some errors in transcription may have occurred.

## 2021-06-14 NOTE — PATIENT INSTRUCTIONS
Patient Education        High-Fiber Diet: Care Instructions  Your Care Instructions     A high-fiber diet may help you relieve constipation and feel less bloated. Your doctor and dietitian will help you make a high-fiber eating plan based on your personal needs. The plan will include the things you like to eat. It will also make sure that you get 30 grams of fiber a day. Before you make changes to the way you eat, be sure to talk with your doctor or dietitian. Follow-up care is a key part of your treatment and safety. Be sure to make and go to all appointments, and call your doctor if you are having problems. It's also a good idea to know your test results and keep a list of the medicines you take. How can you care for yourself at home? · You can increase how much fiber you get if you eat more of certain foods. These foods include:  ? Whole-grain breads and cereals. ? Fruits, such as pears, apples, and peaches. Eat the skins, peels, and seeds, if you can.  ? Vegetables, such as broccoli, cabbage, spinach, carrots, asparagus, and squash. ? Starchy vegetables. These include potatoes with skins, kidney beans, and lima beans. · Take a fiber supplement every day if your doctor recommends it. Examples are Benefiber, Citrucel, FiberCon, and Metamucil. Ask your doctor how much to take. · Drink plenty of fluids. If you have kidney, heart, or liver disease and have to limit fluids, talk with your doctor before you increase the amount of fluids you drink. · Get some exercise every day. Exercise helps stool move through the colon. It also helps prevent constipation. · Keep a food diary. Try to notice and write down what foods cause gas, pain, or other symptoms. Then you can avoid these foods. Where can you learn more? Go to https://alisha.[employer]. org and sign in to your [employer] account. Enter O811 in the [facility] box to learn more about \"High-Fiber Diet: Care Instructions. \"     If 2020               Content Version: 12.8  © 3411-9874 [employer], [employer]. Care instructions adapted under license by [facility] ([facility]). If you have questions about a medical condition or this instruction, always ask your healthcare professional. [address] any warranty or liability for your use of this information.

## 2021-08-04 DIAGNOSIS — M79.671 RIGHT FOOT PAIN: Primary | ICD-10-CM

## 2021-08-09 ENCOUNTER — OFFICE VISIT (OUTPATIENT)
Dept: ORTHOPEDIC SURGERY | Age: 41
End: 2021-08-09
Payer: COMMERCIAL

## 2021-08-09 VITALS — BODY MASS INDEX: 31.5 KG/M2 | RESPIRATION RATE: 18 BRPM | WEIGHT: 220 LBS | HEIGHT: 70 IN

## 2021-08-09 DIAGNOSIS — G57.51 TARSAL TUNNEL SYNDROME OF RIGHT SIDE: ICD-10-CM

## 2021-08-09 DIAGNOSIS — T14.8XXA NERVE INJURY: Primary | ICD-10-CM

## 2021-08-09 PROCEDURE — 99203 OFFICE O/P NEW LOW 30 MIN: CPT | Performed by: ORTHOPAEDIC SURGERY

## 2021-08-09 NOTE — PROGRESS NOTES
[facility] [address] [facility]  Anjali Salmeron [facility] [address].  [address] [facility] [address]  Dept: [phone]    Ambulatory Orthopedic Consult      CHIEF COMPLAINT:    Chief Complaint   Patient presents with    Numbness     Right foot       HISTORY OF PRESENT ILLNESS:      The patient is a 41 y.o. male who is being seen for evaluation of the above, which began 1/22/2021 secondary to a twisting injury at work  . At today's visit, he is using no brace/assistive device. History is obtained today from:   [x]  the patient     [x]  EMR     []  one family member/friend    []  multiple family members/friends    []  other:      He denies any pain in his right foot/ankle, and denies any issue prior to his injury. He reports a sense of numbness and tingling medially throughout his midfoot and forefoot. He denies any limitations from this, reports he has been back to work full-time without restriction, and states that he is able to live with this. REVIEW OF SYSTEMS:  Constitutional: Negative for fever. HENT: Negative for tinnitus. Eyes: Negative for pain. Respiratory: Negative for shortness of breath. Cardiovascular: Negative for chest pain. Gastrointestinal: Positive for abdominal pain. Genitourinary: Negative for dysuria. Skin: Negative for rash. Neurological: Negative for headaches. Hematological: Does not bruise/bleed easily. Musculoskeletal: See HPI for pertinent positives     Past Medical History:    He  has a past medical history of Abdominal hernia, Asymptomatic varicose veins, Cervicalgia, Disorders of bilirubin excretion, Disturbance of skin sensation, Fear of flying, Gilbert's syndrome, and MAGDIEL on CPAP. Past Surgical History:    He  has a past surgical history that includes Appendectomy and hernia repair (2010).      Current Medications:     Current Outpatient Medications:     FIBER COMPLETE PO, Take 2 tablets by mouth daily, Disp: , Rfl:      Allergies:    Patient has no known allergies. Family History:  family history includes Breast Cancer (age of onset: 58) in his mother; Colon Cancer (age of onset: 59) in his father; Prostate Cancer (age of onset: 61) in his maternal uncle; Prostate Cancer (age of onset: 72) in his maternal grandfather. Social History:   Social History     Occupational History    Not on file   Tobacco Use    Smoking status: Never Smoker    Smokeless tobacco: Never Used   Substance and Sexual Activity    Alcohol use: No    Drug use: Never    Sexual activity: Not on file     Occupation: [employer]     OBJECTIVE:  Resp 18   Ht 5' 10\" (1.778 m)   Wt 220 lb (99.8 kg)   BMI 31.57 kg/m²    Psych: alert and oriented to person, time, and place   Cardio:  well perfused extremities, no cyanosis   Resp:  normal respiratory effort  Musculoskeletal:    Affected lower extremity:    Vascular: Limb well perfused, compartments soft/compressible. Skin: No erythema/ulcers. Intact. Neurovascular Status:  Grossly neurovascularly intact throughout but diminished sensation medially throughout the midfoot and forefoot  Motion:  Grossly able to fire major muscle groups with appropriate/expected AROM  Tenderness to Palpation: None  -Tinel's is positive at the tarsal tunnel       RADIOLOGY:   8/9/2021 FINDINGS:  Three views (AP, Mortise, and Lateral) of the right ankle and three views (AP, Oblique, Lateral) of the right foot were obtained in the office today and reviewed, revealing no acute fracture, dislocation, or radiopaque foreign body/tumor. IMPRESSION:  No acute fracture/dislocation. Electronically signed by Bert Blankenship MD      Relevant previous imaging reviewed, both imaging and report(s) as below:    No results found. ASSESSMENT AND PLAN:  Body mass index is 31.57 kg/m². 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   We did discuss the possibility of a space-occupying lesion in his tarsal tunnel. Notably, he has no relevant past medical history. We had a discussion today about the likely diagnosis and its natural history, physical exam and imaging findings, as well as various treatment options in detail. Surgically, we discussed a possible right foot tarsal tunnel decompression, depending on the results of his MRI as below. At today's visit, I did not recommend any surgical intervention. He denies any limitations from this, reports he has been back to work full-time without restriction, and states that he is able to live with this, but did state that he is concerned about potentially getting worse, and would like to make sure that this does not get worse with time. We discussed that if there is a space-occupying lesion, then it could conceivably become worse over time, and thus an MRI would help elucidate this. Orders/referrals were placed as below at today's visit. In order to know exactly how to proceed with treatment (surgical versus nonsurgical, as well as how), an MRI was ordered today to evaluate his tarsal tunnel for space-occupying lesion. This is medically necessary to evaluate the structures in this area, for both diagnosis and treatment. All questions were answered and the above plan was agreed upon. The patient will return to clinic after the MRI has been obtained without x-rays. At his next visit, we will review his MRI, and discuss his treatment options.             At the patient's next visit, depending on how the patient is doing and/or new imaging/labs results, we may consider the following options:    []  Lace up ankle     []  CAM boot         []  removable wrist brace     []  PT:        []  Wean out immobilization         []  Adv activity      []  Footmind        []  Spenco       []  Custom Orthotic:               []  AZ brace                    []  Rocker Bottom      []  Night splint    []  Heel cups        []  Strap []  Toe gizmos    []  Topl        []  NSAIDs         []  Erlin        []  Ref:         []  Stress Xray    []  CT        []  MRI  []  Inj:          []  Consider OR      []  Pick OR date    No follow-ups on file. No orders of the defined types were placed in this encounter. No orders of the defined types were placed in this encounter. Kayleigh James MD  Orthopedic Surgery        Please excuse any typos/errors, as this note was created with the assistance of voice recognition software. While intending to generate a document that actually reflects the content of the visit, the document can still have some errors including those of syntax and sound-a-like substitutions which may escape proof reading. In such instances, actual meaning can be extrapolated by context.

## 2021-08-09 NOTE — LETTER
[address]  [address] [facility] [address]  [address] [facility] [address]  Phone: [phone]  Fax: [phone]           Brenton Gauthier MD      August 9, 2021     Patient: Donnell Sims   MR Number: [id number]   YOB: 1980   Date of Visit: 8/9/2021       Dear Dr. Demi Balderrama: Thank you for referring Hamilton Hernandez to me for evaluation/treatment. Below are the relevant portions of my assessment and plan of care. 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 We did discuss the possibility of a space-occupying lesion in his tarsal tunnel. Notably, he has no relevant past medical history. We had a discussion today about the likely diagnosis and its natural history, physical exam and imaging findings, as well as various treatment options in detail. Surgically, we discussed a possible right foot tarsal tunnel decompression, depending on the results of his MRI as below. At today's visit, I did not recommend any surgical intervention. He denies any limitations from this, reports he has been back to work full-time without restriction, and states that he is able to live with this, but did state that he is concerned about potentially getting worse, and would like to make sure that this does not get worse with time. We discussed that if there is a space-occupying lesion, then it could conceivably become worse over time, and thus an MRI would help elucidate this. Orders/referrals were placed as below at today's visit. In order to know exactly how to proceed with treatment (surgical versus nonsurgical, as well as how), an MRI was ordered today to evaluate his tarsal tunnel for space-occupying lesion. This is medically necessary to evaluate the structures in this area, for both diagnosis and treatment. All questions were answered and the above plan was agreed upon.  The patient will return to clinic after the MRI has been obtained without x-rays. At his next visit, we will review his MRI, and discuss his treatment options. If you have questions, please do not hesitate to call me. I look forward to following Pietro Mcdonough along with you.     Sincerely,        Valeria Worthington MD     providers:  Kristina Javier MD  [address] [facility] [address] [place] [facility] [phone]  [address]

## 2021-08-10 ENCOUNTER — TELEPHONE (OUTPATIENT)
Dept: INTERNAL MEDICINE CLINIC | Age: 41
End: 2021-08-10

## 2021-08-10 ENCOUNTER — TELEPHONE (OUTPATIENT)
Dept: GASTROENTEROLOGY | Age: 41
End: 2021-08-10

## 2021-08-10 NOTE — TELEPHONE ENCOUNTER
----- Message from Usha Qureshi sent at 8/10/2021 12:59 PM EDT -----  Subject: Message to Provider    QUESTIONS  Information for Provider? Patient is calling because he states he was   supposed to received a call from his gastroenterologist Inderjit Estrella to set   up appointment. Patient wants to know if the office will call him or does   he have to call the office. Please advise  ---------------------------------------------------------------------------  --------------  CALL BACK INFO  What is the best way for the office to contact you? OK to leave message on   voicemail  Preferred Call Back Phone Number? [phone]  ---------------------------------------------------------------------------  --------------  SCRIPT ANSWERS  Relationship to Patient?  Self

## 2021-08-10 NOTE — TELEPHONE ENCOUNTER
Patient left vm asking to be schedule for Colonoscopy.  Patient referred to Tracy Burnett for Colon screen

## 2021-08-31 ENCOUNTER — OFFICE VISIT (OUTPATIENT)
Dept: GASTROENTEROLOGY | Age: 41
End: 2021-08-31
Payer: COMMERCIAL

## 2021-08-31 VITALS
WEIGHT: 220 LBS | DIASTOLIC BLOOD PRESSURE: 68 MMHG | SYSTOLIC BLOOD PRESSURE: 100 MMHG | HEIGHT: 70 IN | BODY MASS INDEX: 31.5 KG/M2

## 2021-08-31 DIAGNOSIS — Z80.0 FAMILY HISTORY OF COLON CANCER: Primary | ICD-10-CM

## 2021-08-31 DIAGNOSIS — R10.32 ABDOMINAL PAIN, LEFT LOWER QUADRANT: ICD-10-CM

## 2021-08-31 DIAGNOSIS — E66.9 MILD OBESITY: ICD-10-CM

## 2021-08-31 PROCEDURE — G8417 CALC BMI ABV UP PARAM F/U: HCPCS | Performed by: INTERNAL MEDICINE

## 2021-08-31 PROCEDURE — G8427 DOCREV CUR MEDS BY ELIG CLIN: HCPCS | Performed by: INTERNAL MEDICINE

## 2021-08-31 PROCEDURE — 1036F TOBACCO NON-USER: CPT | Performed by: INTERNAL MEDICINE

## 2021-08-31 PROCEDURE — 99203 OFFICE O/P NEW LOW 30 MIN: CPT | Performed by: INTERNAL MEDICINE

## 2021-08-31 RX ORDER — BISACODYL 5 MG
TABLET, DELAYED RELEASE (ENTERIC COATED) ORAL
Qty: 4 TABLET | Refills: 0 | Status: SHIPPED | OUTPATIENT
Start: 2021-08-31

## 2021-08-31 RX ORDER — POLYETHYLENE GLYCOL 3350 17 G/17G
POWDER, FOR SOLUTION ORAL
Qty: 238 G | Refills: 0 | Status: SHIPPED | OUTPATIENT
Start: 2021-08-31

## 2021-08-31 ASSESSMENT — ENCOUNTER SYMPTOMS
ANAL BLEEDING: 0
ABDOMINAL PAIN: 1
COUGH: 0
TROUBLE SWALLOWING: 0
NAUSEA: 0
VOMITING: 0
ABDOMINAL DISTENTION: 0
RECTAL PAIN: 0
BLOOD IN STOOL: 0
CONSTIPATION: 0
CHOKING: 0
DIARRHEA: 0

## 2021-08-31 NOTE — PROGRESS NOTES
GI NEW PATIENT OFFICE VISIT    INTERVAL HISTORY:   Joslyn Davila, APRN - CNP  [facility],  [address] [facility]    Chief Complaint   Patient presents with    New Patient     Patient is a referred for family hx of colon cancer.  Abdominal Pain     Notes over the last few months have a dull pain around his stomach. Notes when he get its it is very quick. Notes getting better.  Other     Notes last colon was done at [facility] about 9 years ago. Denies nausea or vomiting. Denies bleeding. 1. Family history of colon cancer    2. Mild obesity    3. Abdominal pain, left lower quadrant      This patient is evaluated in my office for the first time  He has significant family history for colon cancer in his father  He was diagnosed with colon cancer at age 74 and  at age 74    Patient apparently had a colonoscopy done a while ago and says that it was normal at that time he had some abdominal discomfort and pain and nothing about the family history    Patient has nonspecific lower abdominal pelvic area pain    Mild abdominal bloating and gas symptoms  No weight loss    Has mild obesity    No smoking alcohol abuse illicit drug usage    No significant GERD dysphagia nausea vomiting hematemesis        HISTORY OF PRESENT ILLNESS: Zoran Mackenzie is a 35 y.o. male with a past history remarkable for , referred for evaluation of   Chief Complaint   Patient presents with    New Patient     Patient is a referred for family hx of colon cancer.  Abdominal Pain     Notes over the last few months have a dull pain around his stomach. Notes when he get its it is very quick. Notes getting better.  Other     Notes last colon was done at [facility] about 9 years ago. Denies nausea or vomiting. Denies bleeding.    .    Past Medical,Family, and Social History reviewed and does contribute to the patient presenting condition. Patient's PMH/PSH,SH,PSYCH Hx, MEDs, ALLERGIES, and ROS were all reviewed and updated in the appropriate sections. PAST MEDICAL HISTORY:  Past Medical History:   Diagnosis Date    Abdominal hernia     Asymptomatic varicose veins     Cervicalgia     Disorders of bilirubin excretion     Disturbance of skin sensation     Fear of flying     Gilbert's syndrome     MAGDIEL on CPAP        Past Surgical History:   Procedure Laterality Date    APPENDECTOMY      HERNIA REPAIR  2010       CURRENT MEDICATIONS:    Current Outpatient Medications:     FIBER COMPLETE PO, Take 2 tablets by mouth daily, Disp: , Rfl:     ALLERGIES:   No Known Allergies    FAMILY HISTORY:       Problem Relation Age of Onset    Breast Cancer Mother 58    Colon Cancer Father 59    Prostate Cancer Maternal Grandfather 72    Prostate Cancer Maternal Uncle 61         SOCIAL HISTORY:   Social History     Socioeconomic History    Marital status:      Spouse name: Not on file    Number of children: Not on file    Years of education: Not on file    Highest education level: Not on file   Occupational History    Not on file   Tobacco Use    Smoking status: Never Smoker    Smokeless tobacco: Never Used   Vaping Use    Vaping Use: Never used   Substance and Sexual Activity    Alcohol use: No    Drug use: Never    Sexual activity: Not on file   Other Topics Concern    Not on file   Social History Narrative    Not on file     Social Determinants of Health     Financial Resource Strain: Low Risk     Difficulty of Paying Living Expenses: Not hard at all   Food Insecurity: No Food Insecurity    Worried About Running Out of Food in the Last Year: Never true    Lucy of Food in the Last Year: Never true   Transportation Needs: No Transportation Needs    Lack of Transportation (Medical): No    Lack of Transportation (Non-Medical):  No   Physical Activity:     Days of Exercise per Week:     Minutes of Exercise per Session:    Stress:     Feeling of Stress :    Social Connections:     Frequency of Communication with Friends and Family:     Frequency of Social Gatherings with Friends and Family:     Attends Hinduism Services:     Active Member of Clubs or Organizations:     Attends Club or Organization Meetings:     Marital Status:    Intimate Partner Violence:     Fear of Current or Ex-Partner:     Emotionally Abused:     Physically Abused:     Sexually Abused:          REVIEW OF SYSTEMS:         Review of Systems   Constitutional: Negative for appetite change and fatigue. HENT: Negative for trouble swallowing. Respiratory: Negative for cough and choking. Cardiovascular: Negative for chest pain. Gastrointestinal: Positive for abdominal pain. Negative for abdominal distention, anal bleeding, blood in stool, constipation, diarrhea, nausea, rectal pain and vomiting. Neurological: Negative for headaches. Psychiatric/Behavioral: Negative for sleep disturbance. PHYSICAL EXAMINATION: Vital signs reviewed per the nursing documentation. /68   Ht 5' 10\" (1.778 m)   Wt 220 lb (99.8 kg)   BMI 31.57 kg/m²   Body mass index is 31.57 kg/m². Physical Exam  Nursing note reviewed. Constitutional:       Appearance: He is well-developed. Comments: Anxious    HENT:      Head: Normocephalic and atraumatic. Eyes:      Conjunctiva/sclera: Conjunctivae normal.      Pupils: Pupils are equal, round, and reactive to light. Cardiovascular:      Rate and Rhythm: Normal rate and regular rhythm. Pulmonary:      Effort: Pulmonary effort is normal.      Breath sounds: Normal breath sounds. Abdominal:      General: Bowel sounds are normal.      Palpations: Abdomen is soft. Comments: NON TENDER, NON DISTENTED  LIVER SPLEEN AND HERNIAS ARE NOT  PALPABLE  BOWEL SOUNDS ARE POSITIVE      Genitourinary:     Rectum: Normal.   Musculoskeletal:         General: Normal range of motion.       Cervical back: Normal range of motion and neck supple. Skin:     General: Skin is warm. Neurological:      Mental Status: He is alert and oriented to person, place, and time. Deep Tendon Reflexes: Reflexes are normal and symmetric. LABORATORY DATA: Reviewed  Lab Results   Component Value Date    WBC 5.4 10/16/2020    HGB 16.6 10/16/2020    HCT 50.6 (H) 10/16/2020    MCV 93.7 10/16/2020     10/16/2020     10/16/2020    K 4.7 10/16/2020     10/16/2020    CO2 24 10/16/2020    BUN 15 10/16/2020    CREATININE 0.73 10/16/2020    LABALBU 4.4 10/30/2020    BILITOT 1.64 (H) 10/30/2020    ALKPHOS 65 10/30/2020    AST 23 10/30/2020    ALT 24 10/30/2020         Lab Results   Component Value Date    RBC 5.40 10/16/2020    HGB 16.6 10/16/2020    MCV 93.7 10/16/2020    MCH 30.7 10/16/2020    MCHC 32.8 10/16/2020    RDW 13.2 10/16/2020    MPV 10.6 10/16/2020         DIAGNOSTIC TESTING:     XR ANKLE RIGHT (MIN 3 VIEWS)    Result Date: 8/9/2021 8/9/2021 FINDINGS:  Three views (AP, Mortise, and Lateral) of the right ankle and three views (AP, Oblique, Lateral) of the right foot were obtained in the office today and reviewed, revealing no acute fracture, dislocation, or radiopaque foreign body/tumor.        No acute fracture/dislocation.   Electronically signed by Yariel Goldberg MD      XR FOOT RIGHT (MIN 3 VIEWS)    Result Date: 8/9/2021 8/9/2021 FINDINGS:  Three views (AP, Mortise, and Lateral) of the right ankle and three views (AP, Oblique, Lateral) of the right foot were obtained in the office today and reviewed, revealing no acute fracture, dislocation, or radiopaque foreign body/tumor.        No acute fracture/dislocation.   Electronically signed by Yariel Goldberg MD           Assessment  1. Family history of colon cancer    2. Mild obesity    3.  Abdominal pain, left lower quadrant        Plan    Plan Colonoscopy    The Endoscopic procedure was explained to the patient in detail  The prep and NPO were explained  All the Risks, Benefits, and Alternatives were explained  Risk of Bleeding, Perforation and Cardio Respiratory risks were explained  his questions were answered  The procedure has been scheduled with the  in the office  Patient was asked to give us a call for any questions  The patient has verbalized understanding and agreement to this plan. Pt was advised in detail about some life style and dietary modifications. He was advised about avoidance of caffeine, nicotine and chocolate. Pt was also told to stay away from any kind of fast foods, soda pops. He was also advised to avoid lots of spices, grease and fried food etc.     Instructions were also given about trying to arrange the timing, quality and quantity of food. Instructions were given about using ample amount of fiber including dietary and supplemental fiber either metamucil, bennafiber or citrucell etc.  Pt was advised about drinking ample amount of water without any colors or chemicals. Stress was given about regular exercise. Pt has verbalized understanding and agreement to these modifications. The patient was instructed to start taking some OTC Probiotics products   These are available over the counter at the Pharmacy stores and Grocery stores  He was explained about the beneficial effects they have in the GI track  They will help to establish the good bacterial leena and will help with the digestion and bowel movements  The patient has verbalized understanding and agreement to this plan      Pt was given advices and instructions about weight loss. He was advised about the significance of exercise at least three times a week . Dietary advices were also given about the avoidance of fast food, fried food and lots of spices and grease.      nstructions were given about using ample amount of fiber including dietary and supplemental fiber either metamucil, bennafiber or citrucell etc.  Pt was advised about drinking ample amount of water without any colors or chemicals. Stress was given about regular exercise. Pt was told to stay way from soda pops. Pt has verbalized understanding and agrrement  More than half of patient's clinic visit time was spent in counseling about lifestyle and dietary modifications  Patient's  questions were answered in this regard as well  The patient has verbalized understanding and agreement         Thank you for allowing me to participate in the care of Mr. Janet Cuba. For any further questions please do not hesitate to contact me. I have reviewed and agree with the ROS entered by the MA/Nurse.          Merissa Sellers MD, [facility]  Board Certified in Gastroenterology and [address] [place] [address] Gastroenterology  Office #: [phone]

## 2021-09-13 ENCOUNTER — TELEPHONE (OUTPATIENT)
Dept: ORTHOPEDIC SURGERY | Age: 41
End: 2021-09-13

## 2021-09-13 NOTE — TELEPHONE ENCOUNTER
Naida martin/[employer], worker's comp  called, she would like the notes and MRI orders for his referral for his right foot.     Notes & MRI order can be faxed to [phone]

## 2021-09-13 NOTE — TELEPHONE ENCOUNTER
Maria Eugenia martin/Centralized scheduling called, patient was wanting to schedule his MRI that Dr Naomi Gallegos ordered for him, but she did not see an approval in the system from [facility].     Please check on the status of this C9

## 2021-09-14 ENCOUNTER — TELEPHONE (OUTPATIENT)
Dept: ADMINISTRATIVE | Age: 41
End: 2021-09-14

## 2021-09-14 NOTE — TELEPHONE ENCOUNTER
Rep from [employer] called needing to get the pt's MRI order so they can help the pt frandy.  Please fax order to fax number [phone]

## 2021-09-15 NOTE — TELEPHONE ENCOUNTER
Rep called to follow up as they did no receive fax. He is requesting we refax.        I verified fax # [phone]

## 2021-09-16 NOTE — TELEPHONE ENCOUNTER
Rep called again ,says he still has not received fax. They use a fax to electronic program so it may be on his end. He is asking if it is possible to Email to;  [email or web address]

## 2021-09-23 ENCOUNTER — HOSPITAL ENCOUNTER (OUTPATIENT)
Dept: PREADMISSION TESTING | Age: 41
Discharge: HOME | End: 2021-09-27
Payer: COMMERCIAL

## 2021-09-23 VITALS — HEIGHT: 70 IN | WEIGHT: 215 LBS | BODY MASS INDEX: 30.78 KG/M2

## 2021-09-23 NOTE — PROGRESS NOTES

## 2021-09-29 ENCOUNTER — TELEPHONE (OUTPATIENT)
Dept: ORTHOPEDIC SURGERY | Age: 41
End: 2021-09-29

## 2021-09-29 NOTE — TELEPHONE ENCOUNTER
Patient was last seen in the office on 8/9/21 with Dr Massimo Carmen and had an MRI ordered. However, when the patient went to have the MRI completed, he was told that the order was for the wrong foot/ankle. Patient was seen for [facility] right foot/ankle pain but order was for the left ankle. Please send new order and the patient would like a call once order has been sent.

## 2021-09-30 NOTE — TELEPHONE ENCOUNTER
JADONM to call the office with [facility] information. Apologized for all the miscommunication. Would like all the information he has regarding his  claim number, O contact numbers etc.  Do not have clear information. There is no information to the claim on the [address] website.

## 2021-10-03 ENCOUNTER — HOSPITAL ENCOUNTER (OUTPATIENT)
Dept: LAB | Age: 41
Setting detail: SPECIMEN
Discharge: HOME | End: 2021-10-03
Payer: COMMERCIAL

## 2021-10-03 DIAGNOSIS — Z01.818 PREOP TESTING: Primary | ICD-10-CM

## 2021-10-03 PROCEDURE — U0003 INFECTIOUS AGENT DETECTION BY NUCLEIC ACID (DNA OR RNA); SEVERE ACUTE RESPIRATORY SYNDROME CORONAVIRUS 2 (SARS-COV-2) (CORONAVIRUS DISEASE [COVID-19]), AMPLIFIED PROBE TECHNIQUE, MAKING USE OF HIGH THROUGHPUT TECHNOLOGIES AS DESCRIBED BY CMS-2020-01-R: HCPCS

## 2021-10-03 PROCEDURE — U0005 INFEC AGEN DETEC AMPLI PROBE: HCPCS

## 2021-10-04 DIAGNOSIS — G57.51 TARSAL TUNNEL SYNDROME OF RIGHT SIDE: Primary | ICD-10-CM

## 2021-10-04 LAB
SARS-COV-2: NORMAL
SARS-COV-2: NOT DETECTED
SOURCE: NORMAL

## 2021-10-05 ENCOUNTER — TELEPHONE (OUTPATIENT)
Dept: ORTHOPEDIC SURGERY | Age: 41
End: 2021-10-05

## 2021-10-05 NOTE — TELEPHONE ENCOUNTER
Dina from [facility] updated medical and work status report for PT.   Please fax to [phone]

## 2021-10-06 ENCOUNTER — ANESTHESIA EVENT (OUTPATIENT)
Dept: ENDOSCOPY | Age: 41
End: 2021-10-06
Payer: COMMERCIAL

## 2021-10-07 ENCOUNTER — HOSPITAL ENCOUNTER (OUTPATIENT)
Age: 41
Setting detail: OUTPATIENT SURGERY
Discharge: HOME | End: 2021-10-07
Attending: INTERNAL MEDICINE | Admitting: INTERNAL MEDICINE
Payer: COMMERCIAL

## 2021-10-07 ENCOUNTER — ANESTHESIA (OUTPATIENT)
Dept: ENDOSCOPY | Age: 41
End: 2021-10-07
Payer: COMMERCIAL

## 2021-10-07 VITALS
WEIGHT: 215 LBS | OXYGEN SATURATION: 98 % | DIASTOLIC BLOOD PRESSURE: 58 MMHG | TEMPERATURE: 98 F | HEART RATE: 64 BPM | RESPIRATION RATE: 16 BRPM | HEIGHT: 70 IN | SYSTOLIC BLOOD PRESSURE: 99 MMHG | BODY MASS INDEX: 30.78 KG/M2

## 2021-10-07 VITALS — TEMPERATURE: 96.8 F | OXYGEN SATURATION: 97 % | DIASTOLIC BLOOD PRESSURE: 52 MMHG | SYSTOLIC BLOOD PRESSURE: 90 MMHG

## 2021-10-07 PROCEDURE — 88305 TISSUE EXAM BY PATHOLOGIST: CPT

## 2021-10-07 PROCEDURE — 2580000003 HC RX 258: Performed by: ANESTHESIOLOGY

## 2021-10-07 PROCEDURE — 3609010300 HC COLONOSCOPY W/BIOPSY SINGLE/MULTIPLE: Performed by: INTERNAL MEDICINE

## 2021-10-07 PROCEDURE — 7100000001 HC PACU RECOVERY - ADDTL 15 MIN: Performed by: INTERNAL MEDICINE

## 2021-10-07 PROCEDURE — 3700000000 HC ANESTHESIA ATTENDED CARE: Performed by: INTERNAL MEDICINE

## 2021-10-07 PROCEDURE — 2709999900 HC NON-CHARGEABLE SUPPLY: Performed by: INTERNAL MEDICINE

## 2021-10-07 PROCEDURE — 6360000002 HC RX W HCPCS: Performed by: NURSE ANESTHETIST, CERTIFIED REGISTERED

## 2021-10-07 PROCEDURE — 7100000000 HC PACU RECOVERY - FIRST 15 MIN: Performed by: INTERNAL MEDICINE

## 2021-10-07 PROCEDURE — 2500000003 HC RX 250 WO HCPCS: Performed by: NURSE ANESTHETIST, CERTIFIED REGISTERED

## 2021-10-07 PROCEDURE — 3700000001 HC ADD 15 MINUTES (ANESTHESIA): Performed by: INTERNAL MEDICINE

## 2021-10-07 PROCEDURE — 45380 COLONOSCOPY AND BIOPSY: CPT | Performed by: INTERNAL MEDICINE

## 2021-10-07 RX ORDER — LIDOCAINE HYDROCHLORIDE 20 MG/ML
INJECTION, SOLUTION EPIDURAL; INFILTRATION; INTRACAUDAL; PERINEURAL PRN
Status: DISCONTINUED | OUTPATIENT
Start: 2021-10-07 | End: 2021-10-07 | Stop reason: SDUPTHER

## 2021-10-07 RX ORDER — SODIUM CHLORIDE, SODIUM LACTATE, POTASSIUM CHLORIDE, CALCIUM CHLORIDE 600; 310; 30; 20 MG/100ML; MG/100ML; MG/100ML; MG/100ML
INJECTION, SOLUTION INTRAVENOUS CONTINUOUS
Status: DISCONTINUED | OUTPATIENT
Start: 2021-10-07 | End: 2021-10-07 | Stop reason: HOSPADM

## 2021-10-07 RX ORDER — LIDOCAINE HYDROCHLORIDE 10 MG/ML
1 INJECTION, SOLUTION EPIDURAL; INFILTRATION; INTRACAUDAL; PERINEURAL
Status: DISCONTINUED | OUTPATIENT
Start: 2021-10-07 | End: 2021-10-07 | Stop reason: HOSPADM

## 2021-10-07 RX ORDER — PROPOFOL 10 MG/ML
INJECTION, EMULSION INTRAVENOUS PRN
Status: DISCONTINUED | OUTPATIENT
Start: 2021-10-07 | End: 2021-10-07 | Stop reason: SDUPTHER

## 2021-10-07 RX ORDER — SODIUM CHLORIDE 0.9 % (FLUSH) 0.9 %
5-40 SYRINGE (ML) INJECTION PRN
Status: DISCONTINUED | OUTPATIENT
Start: 2021-10-07 | End: 2021-10-07 | Stop reason: HOSPADM

## 2021-10-07 RX ORDER — SODIUM CHLORIDE 9 MG/ML
25 INJECTION, SOLUTION INTRAVENOUS PRN
Status: DISCONTINUED | OUTPATIENT
Start: 2021-10-07 | End: 2021-10-07 | Stop reason: HOSPADM

## 2021-10-07 RX ORDER — SODIUM CHLORIDE 0.9 % (FLUSH) 0.9 %
5-40 SYRINGE (ML) INJECTION EVERY 12 HOURS SCHEDULED
Status: DISCONTINUED | OUTPATIENT
Start: 2021-10-07 | End: 2021-10-07 | Stop reason: HOSPADM

## 2021-10-07 RX ADMIN — PROPOFOL 30 MG: 10 INJECTION, EMULSION INTRAVENOUS at 09:25

## 2021-10-07 RX ADMIN — PROPOFOL 30 MG: 10 INJECTION, EMULSION INTRAVENOUS at 09:21

## 2021-10-07 RX ADMIN — SODIUM CHLORIDE, POTASSIUM CHLORIDE, SODIUM LACTATE AND CALCIUM CHLORIDE: 600; 310; 30; 20 INJECTION, SOLUTION INTRAVENOUS at 08:29

## 2021-10-07 RX ADMIN — PROPOFOL 50 MG: 10 INJECTION, EMULSION INTRAVENOUS at 09:11

## 2021-10-07 RX ADMIN — PROPOFOL 30 MG: 10 INJECTION, EMULSION INTRAVENOUS at 09:18

## 2021-10-07 RX ADMIN — PROPOFOL 30 MG: 10 INJECTION, EMULSION INTRAVENOUS at 09:15

## 2021-10-07 RX ADMIN — LIDOCAINE HYDROCHLORIDE 80 MG: 20 INJECTION, SOLUTION EPIDURAL; INFILTRATION; INTRACAUDAL; PERINEURAL at 09:08

## 2021-10-07 RX ADMIN — PROPOFOL 70 MG: 10 INJECTION, EMULSION INTRAVENOUS at 09:08

## 2021-10-07 RX ADMIN — PROPOFOL 50 MG: 10 INJECTION, EMULSION INTRAVENOUS at 09:12

## 2021-10-07 ASSESSMENT — PULMONARY FUNCTION TESTS
PIF_VALUE: 0

## 2021-10-07 ASSESSMENT — ENCOUNTER SYMPTOMS
SINUS PRESSURE: 0
BACK PAIN: 1
VOMITING: 0
SHORTNESS OF BREATH: 0
RHINORRHEA: 0
DIARRHEA: 0
EYE PAIN: 0
BLOOD IN STOOL: 0
ABDOMINAL PAIN: 1
CONSTIPATION: 0
ABDOMINAL DISTENTION: 0
CHOKING: 0
NAUSEA: 0
APNEA: 1
COUGH: 1
WHEEZING: 0

## 2021-10-07 ASSESSMENT — PAIN - FUNCTIONAL ASSESSMENT: PAIN_FUNCTIONAL_ASSESSMENT: 0-10

## 2021-10-07 ASSESSMENT — PAIN SCALES - GENERAL: PAINLEVEL_OUTOF10: 0

## 2021-10-07 NOTE — ANESTHESIA POSTPROCEDURE EVALUATION
POST- ANESTHESIA EVALUATION       Pt Name: Dima Irving  MRN: [id number]  YOB: 1980  Date of evaluation: 10/7/2021  Time:  10:00 AM      BP (!) 88/53   Pulse 59   Temp 98.2 °F (36.8 °C) (Infrared)   Resp 9   Ht 5' 10\" (1.778 m)   Wt 215 lb (97.5 kg)   SpO2 95%   BMI 30.85 kg/m²      Consciousness Level  Awake  Cardiopulmonary Status  Stable  Pain Adequately Treated YES  Nausea / Vomiting  NO  Adequate Hydration  YES  Anesthesia Related Complications NONE      Electronically signed by Sadia Alvarez MD on 10/7/2021 at 10:00 AM       Department of Anesthesiology  Postprocedure Note    Patient: Dima Irving  MRN: [id number]  YOB: 1980  Date of evaluation: 10/7/2021  Time:  10:00 AM     Procedure Summary     Date: 10/07/21 Room / Location: [place] [address] / [facility]    Anesthesia Start: 9411 Anesthesia Stop: 4249    Procedure: COLONOSCOPY POLYPECTOMY (N/A ) Diagnosis:       (FAMILY HISTORY OF COLON CANCER)      (COVID 10/3)    Surgeons: Keily Greene MD Responsible Provider: Sadia Alvarez MD    Anesthesia Type: general ASA Status: 2          Anesthesia Type: general    Randolph Phase I: Randolph Score: 9    Randolph Phase II:      Last vitals: Reviewed and per EMR flowsheets.        Anesthesia Post Evaluation

## 2021-10-07 NOTE — H&P
HISTORY and Ousman[facility] Yoder [address]       NAME:  Lizette Way  MRN: [id number]   YOB: 1980   Date: 10/7/2021   Age: 35 y.o. Gender: male     COMPLAINT AND PRESENT HISTORY:   Lizette Way is 35 y.o.,  male,undergoing a COLONOSCOPY DIAGNOSTIC for POSITIVE FHX COLON CA. Pt reports mild RLQ pain, states it is dull in nature, continuous. Pain currently 2/10. Denies any n/v/d, no bloody or tarry stools. Pt denies any changes in color, amount or caliber of BM. Review of additional significant medical hx:  MAGDIEL-uses cpap. Denies any difficulty swallowing. Denies any GERD. Denies any SOB, CP, palpitations at this time. Pertinent family hx: Denies family hx of esophageal CA.     NPO status: NOTHING SINCE MIDNIGHT  Medications taken TODAY (with sip of water): NONE  Anticoagulation status: NONE  Prep fully completed: YES. Reports stools being clear \"like water. Denies personal hx of blood clots. Denies personal hx of MRSA infection. Denies any personal or family hx of previous complications w/anesthesia.   Nicotine status: none  PAST MEDICAL HISTORY     Past Medical History:   Diagnosis Date    Abdominal hernia     Asymptomatic varicose veins     Cervicalgia     Disorders of bilirubin excretion     Disturbance of skin sensation     Fear of flying     Gilbert's syndrome     MAGDIEL on CPAP        SURGICAL HISTORY       Past Surgical History:   Procedure Laterality Date    APPENDECTOMY      HERNIA REPAIR  2010       SOCIAL HISTORY       Social History     Socioeconomic History    Marital status:      Spouse name: Not on file    Number of children: Not on file    Years of education: Not on file    Highest education level: Not on file   Occupational History    Not on file   Tobacco Use    Smoking status: Never Smoker    Smokeless tobacco: Never Used   Vaping Use    Vaping Use: Never used   Substance and Sexual Activity    Alcohol use: Yes     Comment: guarding. Musculoskeletal:         General: Swelling present. No tenderness. Normal range of motion. Cervical back: Normal range of motion and neck supple. Right lower leg: Edema present. Left lower leg: Edema present. Comments: BLE +1 non pitting. Skin:     General: Skin is warm and dry. Coloration: Skin is not pale. Findings: No bruising or erythema. Neurological:      General: No focal deficit present. Mental Status: He is alert and oriented to person, place, and time. Psychiatric:         Mood and Affect: Mood normal.         Behavior: Behavior normal.      Comments: Appears anxious.           RECENT LAB WORK     Lab Results   Component Value Date     10/16/2020    K 4.7 10/16/2020     10/16/2020    CO2 24 10/16/2020    BUN 15 10/16/2020    CREATININE 0.73 10/16/2020    GLUCOSE 82 10/16/2020    CALCIUM 9.7 10/16/2020    PROT 6.6 10/30/2020    LABALBU 4.4 10/30/2020    BILITOT 1.64 (H) 10/30/2020    ALKPHOS 65 10/30/2020    AST 23 10/30/2020    ALT 24 10/30/2020    LABGLOM >60 10/16/2020    GFRAA >60 10/16/2020    GLOB NOT REPORTED 10/30/2020     Lab Results   Component Value Date    WBC 5.4 10/16/2020    HGB 16.6 10/16/2020    HCT 50.6 (H) 10/16/2020    MCV 93.7 10/16/2020     10/16/2020     PROVISIONAL DIAGNOSES / SURGERY:      COLONOSCOPY DIAGNOSTIC  FHX COLON CA  Patient Active Problem List    Diagnosis Date Noted    Abdominal pain, left lower quadrant 08/31/2021    Mild obesity 08/31/2021    Family history of colon cancer 08/31/2021    Obesity (BMI 30.0-34.9) 09/24/2019    Fatigue due to sleep pattern disturbance 09/24/2019    MAGDIEL (obstructive sleep apnea) 03/12/2018           TICO Chu CNP on 10/7/2021 at 7:32 AM

## 2021-10-07 NOTE — ANESTHESIA PRE PROCEDURE
Department of Anesthesiology  Preprocedure Note       Name:  Shelly Byers   Age:  35 y.o.  :  1980                                          MRN:  [id number]         Date:  10/7/2021      Surgeon: Tano Borja):  Sonia Burt MD    Procedure: Procedure(s):  COLONOSCOPY DIAGNOSTIC    Medications prior to admission:   Prior to Admission medications    Medication Sig Start Date End Date Taking? Authorizing Provider   polyethylene glycol (GLYCOLAX) 17 GM/SCOOP powder Follow Instructions provided by physician's office 21  Yes Sonia Burt MD   bisacodyl (BISACODYL) 5 MG EC tablet Take 4 tablets in the morning 21  Yes Sonia Burt MD   FIBER COMPLETE PO Take 2 tablets by mouth daily   Yes Historical Provider, MD       Current medications:    Current Facility-Administered Medications   Medication Dose Route Frequency Provider Last Rate Last Admin    lactated ringers infusion   IntraVENous Continuous Jo Ann Willett MD        sodium chloride flush 0.9 % injection 5-40 mL  5-40 mL IntraVENous 2 times per day Jo Ann Willett MD        sodium chloride flush 0.9 % injection 5-40 mL  5-40 mL IntraVENous PRN Jo Ann Willett MD        0.9 % sodium chloride infusion  25 mL IntraVENous PRN Jo Ann Willett MD        lidocaine PF 1 % injection 1 mL  1 mL IntraDERmal Once PRN Jo Ann Willett MD           Allergies:  No Known Allergies    Problem List:    Patient Active Problem List   Diagnosis Code    MAGDIEL (obstructive sleep apnea) G47.33    Obesity (BMI 30.0-34. 9) E66.9    Fatigue due to sleep pattern disturbance R53.83, G47.9    Abdominal pain, left lower quadrant R10.32    Mild obesity E66.9    Family history of colon cancer Z80.0       Past Medical History:        Diagnosis Date    Abdominal hernia     Asymptomatic varicose veins     Cervicalgia     Disorders of bilirubin excretion     Disturbance of skin sensation     Fear of flying     Gilbert's syndrome     MAGDIEL on CPAP        Past Surgical History:        Procedure Laterality Date    APPENDECTOMY      HERNIA REPAIR  2010       Social History:    Social History     Tobacco Use    Smoking status: Never Smoker    Smokeless tobacco: Never Used   Substance Use Topics    Alcohol use: Yes     Comment: socially -rare                                Counseling given: Not Answered      Vital Signs (Current):   Vitals:    10/07/21 0758   BP: (!) 103/58   Pulse: 80   Resp: 16   Temp: 97.1 °F (36.2 °C)   TempSrc: Infrared   SpO2: 97%   Weight: 215 lb (97.5 kg)   Height: 5' 10\" (1.778 m)                                              BP Readings from Last 3 Encounters:   10/07/21 (!) 103/58   08/31/21 100/68   06/14/21 118/78       NPO Status: Time of last liquid consumption: 2000                        Time of last solid consumption: 2000                        Date of last liquid consumption: 10/06/21                        Date of last solid food consumption: 10/05/21    BMI:   Wt Readings from Last 3 Encounters:   10/07/21 215 lb (97.5 kg)   09/23/21 215 lb (97.5 kg)   08/31/21 220 lb (99.8 kg)     Body mass index is 30.85 kg/m². CBC:   Lab Results   Component Value Date    WBC 5.4 10/16/2020    RBC 5.40 10/16/2020    HGB 16.6 10/16/2020    HCT 50.6 10/16/2020    MCV 93.7 10/16/2020    RDW 13.2 10/16/2020     10/16/2020       CMP:   Lab Results   Component Value Date     10/16/2020    K 4.7 10/16/2020     10/16/2020    CO2 24 10/16/2020    BUN 15 10/16/2020    CREATININE 0.73 10/16/2020    GFRAA >60 10/16/2020    LABGLOM >60 10/16/2020    GLUCOSE 82 10/16/2020    PROT 6.6 10/30/2020    CALCIUM 9.7 10/16/2020    BILITOT 1.64 10/30/2020    ALKPHOS 65 10/30/2020    AST 23 10/30/2020    ALT 24 10/30/2020       POC Tests: No results for input(s): POCGLU, POCNA, POCK, POCCL, POCBUN, POCHEMO, POCHCT in the last 72 hours.     Coags: No results found for: PROTIME, INR, APTT    HCG (If Applicable): No results found for: PREGTESTUR, PREGSERUM, HCG, HCGQUANT     ABGs: No results found for: PHART, PO2ART, WOU2GZJ, SOG6HGD, BEART, E6WBKIRH     Type & Screen (If Applicable):  No results found for: LABABO, LABRH    Drug/Infectious Status (If Applicable):  Lab Results   Component Value Date    HEPCAB NONREACTIVE 10/05/2011       COVID-19 Screening (If Applicable):   Lab Results   Component Value Date    COVID19 Not Detected 10/03/2021    COVID19 Detected 11/06/2020           Anesthesia Evaluation  Patient summary reviewed and Nursing notes reviewed  Airway: Mallampati: III  TM distance: >3 FB   Neck ROM: full  Mouth opening: > = 3 FB Dental: normal exam         Pulmonary: breath sounds clear to auscultation  (+) sleep apnea: on CPAP,                             Cardiovascular:Negative CV ROS            Rhythm: regular  Rate: normal                    Neuro/Psych:   Negative Neuro/Psych ROS              GI/Hepatic/Renal: Neg GI/Hepatic/Renal ROS            Endo/Other: Negative Endo/Other ROS                    Abdominal:             Vascular: negative vascular ROS. Other Findings:             Anesthesia Plan      general     ASA 2       Induction: intravenous. Anesthetic plan and risks discussed with patient. Plan discussed with CRNA.                   Steffanie Shah MD   10/7/2021

## 2021-10-08 ENCOUNTER — TELEPHONE (OUTPATIENT)
Dept: ORTHOPEDIC SURGERY | Age: 41
End: 2021-10-08

## 2021-10-08 LAB — SURGICAL PATHOLOGY REPORT: NORMAL

## 2021-10-18 ENCOUNTER — TELEPHONE (OUTPATIENT)
Dept: ORTHOPEDIC SURGERY | Age: 41
End: 2021-10-18

## 2021-10-18 NOTE — TELEPHONE ENCOUNTER
Patient called questioning the correct order for an MRI. Patient is to undergo a MRI of the ???      08/09/2021 \"we discussed a possible right foot tarsal tunnel decompression, depending on the results of his MRI as below\"    Right ankle MRI order has been placed. Please verify and inform staff to the correct MRI to be order. Right Foot OR Ankle for diagnosis tarsal tunnel.       Thank you

## 2021-10-25 ENCOUNTER — OFFICE VISIT (OUTPATIENT)
Dept: INTERNAL MEDICINE CLINIC | Age: 41
End: 2021-10-25
Payer: COMMERCIAL

## 2021-10-25 VITALS
BODY MASS INDEX: 33.21 KG/M2 | DIASTOLIC BLOOD PRESSURE: 70 MMHG | SYSTOLIC BLOOD PRESSURE: 110 MMHG | HEIGHT: 70 IN | WEIGHT: 232 LBS | HEART RATE: 89 BPM | OXYGEN SATURATION: 97 %

## 2021-10-25 DIAGNOSIS — Z13.1 SCREENING FOR DIABETES MELLITUS: ICD-10-CM

## 2021-10-25 DIAGNOSIS — Z13.29 SCREENING FOR THYROID DISORDER: ICD-10-CM

## 2021-10-25 DIAGNOSIS — E78.5 HYPERLIPIDEMIA LDL GOAL <100: ICD-10-CM

## 2021-10-25 DIAGNOSIS — E66.9 OBESITY (BMI 30.0-34.9): ICD-10-CM

## 2021-10-25 DIAGNOSIS — G47.33 OSA (OBSTRUCTIVE SLEEP APNEA): Primary | ICD-10-CM

## 2021-10-25 PROCEDURE — G8417 CALC BMI ABV UP PARAM F/U: HCPCS | Performed by: NURSE PRACTITIONER

## 2021-10-25 PROCEDURE — 1036F TOBACCO NON-USER: CPT | Performed by: NURSE PRACTITIONER

## 2021-10-25 PROCEDURE — G8427 DOCREV CUR MEDS BY ELIG CLIN: HCPCS | Performed by: NURSE PRACTITIONER

## 2021-10-25 PROCEDURE — 99214 OFFICE O/P EST MOD 30 MIN: CPT | Performed by: NURSE PRACTITIONER

## 2021-10-25 PROCEDURE — G8484 FLU IMMUNIZE NO ADMIN: HCPCS | Performed by: NURSE PRACTITIONER

## 2021-10-25 ASSESSMENT — ENCOUNTER SYMPTOMS
COUGH: 0
CHEST TIGHTNESS: 0
DIARRHEA: 0
SINUS PAIN: 0
ABDOMINAL PAIN: 0
SHORTNESS OF BREATH: 0
VOMITING: 0
CONSTIPATION: 0
RHINORRHEA: 0
NAUSEA: 0
TROUBLE SWALLOWING: 0
SORE THROAT: 0
BLOOD IN STOOL: 0
WHEEZING: 0

## 2021-10-25 NOTE — PROGRESS NOTES
Visit Information    Have you changed or started any medications since your last visit including any over-the-counter medicines, vitamins, or herbal medicines? no   Are you having any side effects from any of your medications? -  no  Have you stopped taking any of your medications? Is so, why? -  no    Have you seen any other physician or provider since your last visit? Yes - Records Obtained  Have you had any other diagnostic tests since your last visit? Yes - Records Obtained  Have you been seen in the emergency room and/or had an admission to a hospital since we last saw you? No  Have you had your routine dental cleaning in the past 6 months? yes -     Have you activated your [employer] account? If not, what are your barriers? Yes     Patient Care Team:  Margie Casanova MD as PCP - General (Internal Medicine)    Medical History Review  Past Medical, Family, and Social History reviewed and does contribute to the patient presenting condition    Health Maintenance   Topic Date Due    COVID-19 Vaccine (1) Never done    DTaP/Tdap/Td vaccine (1 - Tdap) Never done    Flu vaccine (1) Never done    Lipid screen  10/09/2025    Hepatitis C screen  Completed    HIV screen  Completed    Hepatitis A vaccine  Aged Out    Hepatitis B vaccine  Aged Out    Hib vaccine  Aged Out    Meningococcal (ACWY) vaccine  Aged Out    Pneumococcal 0-64 years Vaccine  Aged Out         [address] [address]  Dept: [phone]  Dept Fax: [phone]    OfficeProgress/Follow Up Note  Date of patient's visit: 10/25/2021  Patient's Name:  Garima Loving YOB: 1980            Patient Care Team:  Margie Casanova MD as PCP - General (Internal Medicine)    REASON FOR VISIT:  Routine outpatient follow up    HISTORY OF PRESENT ILLNESS:        History was obtained from the patient.      Garima Loving is a 35 y.o. male here today for Forms (needs FMLA )    He presents to our office for routine medical follow up for chronic medical problems. No complaints at this time. Patient Active Problem List   Diagnosis    MAGDIEL (obstructive sleep apnea)    Obesity (BMI 30.0-34. 9)    Fatigue due to sleep pattern disturbance    Abdominal pain, left lower quadrant    Mild obesity    Family history of colon cancer       Health Maintenance Due   Topic Date Due    COVID-19 Vaccine (1) Never done    DTaP/Tdap/Td vaccine (1 - Tdap) Never done    Flu vaccine (1) Never done       MEDICATIONS:      Current Outpatient Medications   Medication Sig Dispense Refill    polyethylene glycol (GLYCOLAX) 17 GM/SCOOP powder Follow Instructions provided by physician's office 238 g 0    bisacodyl (BISACODYL) 5 MG EC tablet Take 4 tablets in the morning 4 tablet 0    FIBER COMPLETE PO Take 2 tablets by mouth daily       No current facility-administered medications for this visit. ALLERGIES:    No Known Allergies      SOCIAL HISTORY    Reviewed and no change from previous record. Jose Farncisco Shen  reports that he has never smoked. He has never used smokeless tobacco.    FAMILY HISTORY:    Reviewed and No change from previous visit    REVIEW OF SYSTEMS:    Review of Systems   Constitutional: Positive for fatigue (noted with increased hours at work and decreased use of CPAP). Negative for appetite change, diaphoresis, fever and unexpected weight change. HENT: Negative for ear pain, postnasal drip, rhinorrhea, sinus pain, sore throat and trouble swallowing. Eyes: Negative for visual disturbance. Respiratory: Negative for cough, chest tightness, shortness of breath and wheezing. Cardiovascular: Negative for chest pain, palpitations and leg swelling. Gastrointestinal: Negative for abdominal pain, blood in stool, constipation, diarrhea, nausea and vomiting. Endocrine: Negative for polydipsia, polyphagia and polyuria. Genitourinary: Negative for difficulty urinating, dysuria and flank pain. Musculoskeletal: Negative for arthralgias and myalgias. Skin: Negative for rash and wound. Neurological: Negative for dizziness, syncope and weakness. Psychiatric/Behavioral: Positive for sleep disturbance (alleviated with use of CPAP). All other systems reviewed and are negative. PHYSICAL EXAM:      Vitals:    10/25/21 1222   BP: 110/70   Pulse: 89   SpO2: 97%   Weight: 232 lb (105.2 kg)   Height: 5' 10\" (1.778 m)     BP Readings from Last 3 Encounters:   10/25/21 110/70   10/07/21 (!) 90/52   10/07/21 (!) 99/58      Wt Readings from Last 3 Encounters:   10/25/21 232 lb (105.2 kg)   10/07/21 215 lb (97.5 kg)   09/23/21 215 lb (97.5 kg)       Physical Exam  Vitals reviewed. Constitutional:       Appearance: Normal appearance. HENT:      Head: Normocephalic. Cardiovascular:      Rate and Rhythm: Normal rate and regular rhythm. Pulses: Normal pulses. Heart sounds: Normal heart sounds. Pulmonary:      Effort: Pulmonary effort is normal.      Breath sounds: Normal breath sounds. Abdominal:      General: Bowel sounds are normal.      Palpations: Abdomen is soft. Musculoskeletal:         General: No swelling, tenderness or deformity. Normal range of motion. Skin:     General: Skin is warm and dry. Neurological:      General: No focal deficit present. Mental Status: He is alert and oriented to person, place, and time. Psychiatric:         Mood and Affect: Mood normal.         Behavior: Behavior normal.         Thought Content:  Thought content normal.         Judgment: Judgment normal.          LABORATORY FINDINGS:    CBC:  Lab Results   Component Value Date    WBC 5.4 10/16/2020    HGB 16.6 10/16/2020     10/16/2020       BMP:    Lab Results   Component Value Date     10/16/2020    K 4.7 10/16/2020     10/16/2020    CO2 24 10/16/2020    BUN 15 10/16/2020    CREATININE 0.73 10/16/2020    GLUCOSE 82 10/16/2020       HEMOGLOBIN A1C:   Lab Results   Component Value Date    LABA1C 5.4 10/16/2020       FASTING LIPID PANEL:  Lab Results   Component Value Date    HDL 40 (L) 10/09/2020       ASSESSMENT AND PLAN:      1. MAGDIEL (obstructive sleep apnea)  - continue use of CPAP    2. Obesity (BMI 30.0-34.9)  - encourage healthy low carb diet  - increase water intake    3. Hyperlipidemia LDL goal <100  - Lipid Panel; Future    4. Screening for diabetes mellitus  - CBC Auto Differential; Future  - Comprehensive Metabolic Panel; Future    5. Screening for thyroid disorder  - TSH without Reflex; Future      FOLLOW UP AND INSTRUCTIONS:   Return in about 3 months (around 1/25/2022). Jaymes Seip received counseling on the following healthy behaviors: nutrition, exercise and medication adherence    Discussed use, benefit, and side effects of prescribed medications. Barriers to medication compliance addressed. All patient questions answered. Patient voiced understanding. Patient given educational materials - see patient instructions    TICO Bueno - CNP   [facility]  10/25/2021, 2:51 PM    Please note that this chart was generated using voice recognition Dragon dictation software. Although everyeffort was made to ensure the accuracy of this automated transcription, some errors in transcription may have occurred.

## 2021-10-28 ENCOUNTER — TELEPHONE (OUTPATIENT)
Dept: ORTHOPEDIC SURGERY | Age: 41
End: 2021-10-28

## 2021-10-28 NOTE — TELEPHONE ENCOUNTER
Tami from [facility] is asking that Dr Ayleen Cruz adjust the MRI referral from \"right ankle\" to \"left ankle\".     Leopold Fritz [phone]    direct line [phone]    Fax# [phone]

## 2021-10-29 NOTE — TELEPHONE ENCOUNTER
LVM for clarification as to why order needs to be changed to left ankle when pt was seen for right.  Left CB#

## 2021-10-29 NOTE — TELEPHONE ENCOUNTER
This is for the right ankle. C9 is for the right ankle. Correct xr order is for the right.   PLS PLS PLS Right side

## 2021-11-02 ENCOUNTER — HOSPITAL ENCOUNTER (OUTPATIENT)
Dept: MRI IMAGING | Age: 41
Discharge: HOME | End: 2021-11-04
Payer: COMMERCIAL

## 2021-11-02 DIAGNOSIS — G57.51 TARSAL TUNNEL SYNDROME OF RIGHT SIDE: ICD-10-CM

## 2021-11-02 PROCEDURE — 73721 MRI JNT OF LWR EXTRE W/O DYE: CPT

## 2021-11-04 ENCOUNTER — OFFICE VISIT (OUTPATIENT)
Dept: GASTROENTEROLOGY | Age: 41
End: 2021-11-04
Payer: COMMERCIAL

## 2021-11-04 VITALS
TEMPERATURE: 98.3 F | DIASTOLIC BLOOD PRESSURE: 71 MMHG | BODY MASS INDEX: 31.62 KG/M2 | SYSTOLIC BLOOD PRESSURE: 108 MMHG | OXYGEN SATURATION: 97 % | HEART RATE: 73 BPM | WEIGHT: 220.9 LBS | HEIGHT: 70 IN

## 2021-11-04 DIAGNOSIS — Z98.890 S/P COLONOSCOPIC POLYPECTOMY: ICD-10-CM

## 2021-11-04 DIAGNOSIS — K63.5 HYPERPLASTIC POLYP OF DESCENDING COLON: ICD-10-CM

## 2021-11-04 DIAGNOSIS — Z80.0 FAMILY HISTORY OF COLON CANCER: Primary | ICD-10-CM

## 2021-11-04 PROCEDURE — G8427 DOCREV CUR MEDS BY ELIG CLIN: HCPCS | Performed by: INTERNAL MEDICINE

## 2021-11-04 PROCEDURE — G8417 CALC BMI ABV UP PARAM F/U: HCPCS | Performed by: INTERNAL MEDICINE

## 2021-11-04 PROCEDURE — G8484 FLU IMMUNIZE NO ADMIN: HCPCS | Performed by: INTERNAL MEDICINE

## 2021-11-04 PROCEDURE — 1036F TOBACCO NON-USER: CPT | Performed by: INTERNAL MEDICINE

## 2021-11-04 PROCEDURE — 99213 OFFICE O/P EST LOW 20 MIN: CPT | Performed by: INTERNAL MEDICINE

## 2021-11-04 ASSESSMENT — ENCOUNTER SYMPTOMS
BLOOD IN STOOL: 0
DIARRHEA: 0
VOMITING: 0
ANAL BLEEDING: 0
COUGH: 0
ABDOMINAL DISTENTION: 0
CHOKING: 0
NAUSEA: 0
RECTAL PAIN: 0
ABDOMINAL PAIN: 1
BACK PAIN: 0
TROUBLE SWALLOWING: 0
CONSTIPATION: 0

## 2021-11-04 NOTE — PROGRESS NOTES
GI OFFICE FOLLOW UP    Shelly Byers is a 35 y.o. male evaluated via on 11/4/2021. Consent:  He and/or health care decision maker is aware that that he may receive a bill for this telephone service, depending on his insurance coverage, and has provided verbal consent to proceed: YES      INTERVAL HISTORY:   No referring provider defined for this encounter. Chief Complaint   Patient presents with    Follow-up     Colonoscopy       1. Family history of colon cancer    2. S/P colonoscopic polypectomy    3. Hyperplastic polyp of descending colon       The patient is here as a follow up of his recent GI procedure. The results have been sent to you separately   The findings were explained to the patient in detail and biopsies were also discussed   with him    Patient had a recent colonoscopy done by myself polyp was removed from the transcolon biopsy which showed to be hyperplastic polyp has significant family stay for colon cancer in his father    Has some mild irritable bowel syndrome-like symptoms denies any rectal bleeding denies any melanotic stools trying to eat healthy and exercising overall feeling much better now      No nausea vomiting hematemesis denies any significant GERD denies any dysphagia      Denies any smoking alcohol abuse illicit drug usage he works for the [employer]      HISTORY OF PRESENT ILLNESS: Francia Sheets is a 35 y.o. male with a past history remarkable for , referred for evaluation of   Chief Complaint   Patient presents with    Follow-up     Colonoscopy   . Past Medical,Family, and Social History reviewed and does contribute to the patient presenting condition. Patient's PMH/PSH,SH,PSYCH Hx, MEDs, ALLERGIES, and ROS were all reviewed and updated in the appropriate sections.     PAST MEDICAL HISTORY:  Past Medical History:   Diagnosis Date    Abdominal hernia     Never true   Transportation Needs: No Transportation Needs    Lack of Transportation (Medical): No    Lack of Transportation (Non-Medical): No   Physical Activity:     Days of Exercise per Week:     Minutes of Exercise per Session:    Stress:     Feeling of Stress :    Social Connections:     Frequency of Communication with Friends and Family:     Frequency of Social Gatherings with Friends and Family:     Attends Shinto Services:     Active Member of Clubs or Organizations:     Attends Club or Organization Meetings:     Marital Status:    Intimate Partner Violence:     Fear of Current or Ex-Partner:     Emotionally Abused:     Physically Abused:     Sexually Abused:          REVIEW OF SYSTEMS:         Review of Systems   Constitutional: Negative for appetite change and fatigue. HENT: Negative for trouble swallowing. Respiratory: Negative for cough and choking. Cardiovascular: Negative for chest pain. Gastrointestinal: Positive for abdominal pain (minor). Negative for abdominal distention, anal bleeding, blood in stool, constipation, diarrhea, nausea, rectal pain and vomiting. Musculoskeletal: Negative for arthralgias, back pain, gait problem and joint swelling. Neurological: Negative for headaches. Psychiatric/Behavioral: Negative for sleep disturbance. PHYSICAL EXAMINATION: Vital signs reviewed per the nursing documentation. /71   Pulse 73   Temp 98.3 °F (36.8 °C)   Ht 5' 10\" (1.778 m)   Wt 220 lb 14.4 oz (100.2 kg)   SpO2 97%   BMI 31.70 kg/m²   Body mass index is 31.7 kg/m². Physical Exam  Nursing note reviewed. Constitutional:       Appearance: He is well-developed. Comments: Anxious    HENT:      Head: Normocephalic and atraumatic. Eyes:      Conjunctiva/sclera: Conjunctivae normal.      Pupils: Pupils are equal, round, and reactive to light. Cardiovascular:      Rate and Rhythm: Normal rate and regular rhythm.    Pulmonary:      Effort: Pulmonary effort is normal.      Breath sounds: Normal breath sounds. Abdominal:      General: Bowel sounds are normal.      Palpations: Abdomen is soft. Comments: NON TENDER, NON DISTENTED  LIVER SPLEEN AND HERNIAS ARE NOT  PALPABLE  BOWEL SOUNDS ARE POSITIVE      Genitourinary:     Rectum: Normal.   Musculoskeletal:         General: Normal range of motion. Cervical back: Normal range of motion and neck supple. Skin:     General: Skin is warm. Neurological:      Mental Status: He is alert and oriented to person, place, and time. Deep Tendon Reflexes: Reflexes are normal and symmetric.            LABORATORY DATA: Reviewed  Lab Results   Component Value Date    WBC 5.4 10/16/2020    HGB 16.6 10/16/2020    HCT 50.6 (H) 10/16/2020    MCV 93.7 10/16/2020     10/16/2020     10/16/2020    K 4.7 10/16/2020     10/16/2020    CO2 24 10/16/2020    BUN 15 10/16/2020    CREATININE 0.73 10/16/2020    LABALBU 4.4 10/30/2020    BILITOT 1.64 (H) 10/30/2020    ALKPHOS 65 10/30/2020    AST 23 10/30/2020    ALT 24 10/30/2020         Lab Results   Component Value Date    RBC 5.40 10/16/2020    HGB 16.6 10/16/2020    MCV 93.7 10/16/2020    MCH 30.7 10/16/2020    MCHC 32.8 10/16/2020    RDW 13.2 10/16/2020    MPV 10.6 10/16/2020         DIAGNOSTIC TESTING:     MRI ANKLE RIGHT WO CONTRAST    Result Date: 11/3/2021  EXAMINATION: MRI OF THE RIGHT ANKLE WITHOUT CONTRAST, 11/2/2021 11:27 am TECHNIQUE: Multiplanar multisequence MRI of the right ankle was performed without the administration of intravenous contrast. COMPARISON: Right ankle plain radiographs from 05/09/2021 HISTORY: ORDERING SYSTEM PROVIDED HISTORY: Tarsal tunnel syndrome of right side TECHNOLOGIST PROVIDED HISTORY: MUST BE SCHEDULED AT [facility] MUST BE DONE ON 3T MAGNET OR GREATER Please have read by [facility] Radiologist evaluation of tarsal tunnel Reason for Exam: evaluation of tarsal tunnel, Tarsal tunnel syndrome of right side Acuity: Unknown Type of Exam: Unknown 47-year-old male with tarsal tunnel syndrome of right side. FINDINGS: SYNDESMOTIC LIGAMENTS:  The anterior-inferior tibiofibular ligament, interosseous membrane and posterior-inferior tibiofibular ligaments are normal. LATERAL COLLATERAL LIGAMENT COMPLEX: Thinning and slight attenuation of the ATFL and calcaneofibular ligament. Posterior talofibular ligament appears intact. DELTOID LIGAMENT COMPLEX: The superficial and deep components of the deltoid ligament complex are normal. SINUS TARSI AND SPRING LIGAMENT:  The fat plug within the sinus tarsi is preserved and the interosseous and cervical ligaments are normal.  The navicular-calcaneal (spring) ligament is without acute abnormality. Lisfranc ligament complex appears intact. MEDIAL TENDONS: The posterior tibialis, flexor digitorum longus and flexor hallucis longus tendons are intact. LATERAL TENDONS: Low-grade partial split tearing of the peroneus brevis tendon. Peroneus longus tendon appears intact. ANTERIOR TENDONS: The tibialis anterior, extensor hallucis longus and extensor digitorum longus tendons are normal in position, morphology and signal. ACHILLES TENDON: The Achilles tendon is normal in position, morphology and signal.  No associated bursitis. PLANTAR FASCIA:  The medial and lateral bundles of the plantar fascia are normal in morphology and signal. There is no evidence of acute plantar fasciitis or tear. No evidence of plantar fascial nodules. TARSAL TUNNEL: There are no obstructing lesions within the tarsal tunnel. BONE MARROW: No osteochondral lesion or defect at the talar dome. Osseous alignment is normal. No acute fracture or dislocation. Bone marrow signal intensity within the visualized osseous structures is within normal limits. No marginal erosions. No tarsal coalition. JOINT SPACES: No significant tibiotalar, subtalar, or intertarsal joint effusion. Mild degenerative changes of the midfoot and TMT joints.  SOFT TISSUES: No discrete organized fluid collection. 1. Mild degenerative changes as detailed above. 2. Low-grade partial split tearing of peroneus brevis tendon. 3. No acute fracture or dislocation. 4. Evidence of remote injury/sprain of the ATFL and calcaneofibular ligament. Assessment  1. Family history of colon cancer    2. S/P colonoscopic polypectomy    3. Hyperplastic polyp of descending colon        Plan    RTC in 2.5 years    Plan colonoscopy after that call for any problem or issuesb    The patient was instructed to start taking some OTC Probiotics products   These are available over the counter at the Pharmacy stores and Grocery stores  He was explained about the beneficial effects they have in the GI track  They will help to establish the good bacterial leena and will help with the digestion and bowel movements  The patient has verbalized understanding and agreement to this plan    Pt was given advices and instructions about weight loss. He was advised about the significance of exercise at least three times a week . Dietary advices were also given about the avoidance of fast food, fried food and lots of spices and grease. nstructions were given about using ample amount of fiber including dietary and supplemental fiber either metamucil, bennafiber or citrucell etc.  Pt was advised about drinking ample amount of water without any colors or chemicals. Stress was given about regular exercise. Pt was told to stay way from soda pops.     Pt has verbalized understanding and agrrement    More than half of patient's clinic visit time was spent in counseling about lifestyle and dietary modifications  Patient's  questions were answered in this regard as well  The patient has verbalized understanding and agreement           I communicated with the patient and/or health care decision maker about   Details of this discussion including any medical advice provided:YES      I affirm this is a Patient Initiated Episode with an Established Patient who has not had a related appointment within my department in the past 7 days or scheduled within the next 24 hours. Total Time: minutes: 21-30 minutes    Note: not billable if this call serves to triage the patient into an appointment for the relevant concern      Thank you for allowing me to participate in the care of Mr. Latia Etienne. For any further questions please do not hesitate to contact me. I have reviewed and agree with the ROS entered by the MA/LPN.          Costa Cedeño MD, [facility]  Board Certified in Gastroenterology and [address] [place] [address] Gastroenterology  Office #: [phone]

## 2021-11-11 ENCOUNTER — HOSPITAL ENCOUNTER (OUTPATIENT)
Age: 41
Setting detail: SPECIMEN
Discharge: HOME | End: 2021-11-11
Payer: COMMERCIAL

## 2021-11-11 ENCOUNTER — TELEPHONE (OUTPATIENT)
Dept: ORTHOPEDIC SURGERY | Age: 41
End: 2021-11-11

## 2021-11-11 DIAGNOSIS — Z13.29 SCREENING FOR THYROID DISORDER: ICD-10-CM

## 2021-11-11 DIAGNOSIS — E78.5 HYPERLIPIDEMIA LDL GOAL <100: ICD-10-CM

## 2021-11-11 DIAGNOSIS — Z13.1 SCREENING FOR DIABETES MELLITUS: ICD-10-CM

## 2021-11-11 LAB
ABSOLUTE EOS #: 0.11 K/UL (ref 0–0.44)
ABSOLUTE IMMATURE GRANULOCYTE: 0.08 K/UL (ref 0–0.3)
ABSOLUTE LYMPH #: 1.73 K/UL (ref 1.1–3.7)
ABSOLUTE MONO #: 0.6 K/UL (ref 0.1–1.2)
ALBUMIN SERPL-MCNC: 4.5 G/DL (ref 3.5–5.2)
ALBUMIN/GLOBULIN RATIO: 1.9 (ref 1–2.5)
ALP BLD-CCNC: 64 U/L (ref 40–129)
ALT SERPL-CCNC: 37 U/L (ref 5–41)
ANION GAP SERPL CALCULATED.3IONS-SCNC: 14 MMOL/L (ref 9–17)
AST SERPL-CCNC: 30 U/L
BASOPHILS # BLD: 1 % (ref 0–2)
BASOPHILS ABSOLUTE: 0.04 K/UL (ref 0–0.2)
BILIRUB SERPL-MCNC: 2.27 MG/DL (ref 0.3–1.2)
BUN BLDV-MCNC: 15 MG/DL (ref 6–20)
BUN/CREAT BLD: ABNORMAL (ref 9–20)
CALCIUM SERPL-MCNC: 9.4 MG/DL (ref 8.6–10.4)
CHLORIDE BLD-SCNC: 105 MMOL/L (ref 98–107)
CHOLESTEROL/HDL RATIO: 5.1
CHOLESTEROL: 245 MG/DL
CO2: 23 MMOL/L (ref 20–31)
CREAT SERPL-MCNC: 0.77 MG/DL (ref 0.7–1.2)
DIFFERENTIAL TYPE: ABNORMAL
EOSINOPHILS RELATIVE PERCENT: 2 % (ref 1–4)
GFR AFRICAN AMERICAN: >60 ML/MIN
GFR NON-AFRICAN AMERICAN: >60 ML/MIN
GFR SERPL CREATININE-BSD FRML MDRD: ABNORMAL ML/MIN/{1.73_M2}
GFR SERPL CREATININE-BSD FRML MDRD: ABNORMAL ML/MIN/{1.73_M2}
GLUCOSE BLD-MCNC: 90 MG/DL (ref 70–99)
HCT VFR BLD CALC: 49.6 % (ref 40.7–50.3)
HDLC SERPL-MCNC: 48 MG/DL
HEMOGLOBIN: 15.8 G/DL (ref 13–17)
IMMATURE GRANULOCYTES: 2 %
LDL CHOLESTEROL: 171 MG/DL (ref 0–130)
LYMPHOCYTES # BLD: 33 % (ref 24–43)
MCH RBC QN AUTO: 30 PG (ref 25.2–33.5)
MCHC RBC AUTO-ENTMCNC: 31.9 G/DL (ref 28.4–34.8)
MCV RBC AUTO: 94.1 FL (ref 82.6–102.9)
MONOCYTES # BLD: 11 % (ref 3–12)
NRBC AUTOMATED: 0 PER 100 WBC
PDW BLD-RTO: 13.6 % (ref 11.8–14.4)
PLATELET # BLD: 217 K/UL (ref 138–453)
PLATELET ESTIMATE: ABNORMAL
PMV BLD AUTO: 10.3 FL (ref 8.1–13.5)
POTASSIUM SERPL-SCNC: 4.3 MMOL/L (ref 3.7–5.3)
RBC # BLD: 5.27 M/UL (ref 4.21–5.77)
RBC # BLD: ABNORMAL 10*6/UL
SEG NEUTROPHILS: 51 % (ref 36–65)
SEGMENTED NEUTROPHILS ABSOLUTE COUNT: 2.75 K/UL (ref 1.5–8.1)
SODIUM BLD-SCNC: 142 MMOL/L (ref 135–144)
TOTAL PROTEIN: 6.9 G/DL (ref 6.4–8.3)
TRIGL SERPL-MCNC: 129 MG/DL
TSH SERPL DL<=0.05 MIU/L-ACNC: 0.95 MIU/L (ref 0.3–5)
VLDLC SERPL CALC-MCNC: ABNORMAL MG/DL (ref 1–30)
WBC # BLD: 5.3 K/UL (ref 3.5–11.3)
WBC # BLD: ABNORMAL 10*3/UL

## 2021-11-18 DIAGNOSIS — R17 ELEVATED BILIRUBIN: Primary | ICD-10-CM

## 2021-11-18 DIAGNOSIS — R10.9 ABDOMINAL PAIN, UNSPECIFIED ABDOMINAL LOCATION: ICD-10-CM

## 2021-11-22 ENCOUNTER — HOSPITAL ENCOUNTER (OUTPATIENT)
Dept: ULTRASOUND IMAGING | Age: 41
Discharge: HOME | End: 2021-11-24
Payer: COMMERCIAL

## 2021-11-22 ENCOUNTER — OFFICE VISIT (OUTPATIENT)
Dept: ORTHOPEDIC SURGERY | Age: 41
End: 2021-11-22
Payer: COMMERCIAL

## 2021-11-22 VITALS — RESPIRATION RATE: 14 BRPM | BODY MASS INDEX: 31.5 KG/M2 | WEIGHT: 220 LBS | HEIGHT: 70 IN | TEMPERATURE: 97.9 F

## 2021-11-22 DIAGNOSIS — R10.9 ABDOMINAL PAIN, UNSPECIFIED ABDOMINAL LOCATION: ICD-10-CM

## 2021-11-22 DIAGNOSIS — G57.51 TARSAL TUNNEL SYNDROME OF RIGHT SIDE: Primary | ICD-10-CM

## 2021-11-22 DIAGNOSIS — T14.8XXA NERVE INJURY: ICD-10-CM

## 2021-11-22 DIAGNOSIS — R17 ELEVATED BILIRUBIN: ICD-10-CM

## 2021-11-22 PROCEDURE — 76705 ECHO EXAM OF ABDOMEN: CPT

## 2021-11-22 PROCEDURE — 99213 OFFICE O/P EST LOW 20 MIN: CPT | Performed by: ORTHOPAEDIC SURGERY

## 2021-11-22 NOTE — PROGRESS NOTES
[facility] [address] [facility]  Anjali Salmeron [facility] [address]  [address] [facility] [address]  Dept: [phone]    Ambulatory Orthopedic Consult      CHIEF COMPLAINT:    Chief Complaint   Patient presents with    Foot Pain     right       HISTORY OF PRESENT ILLNESS:      The patient is a 35 y.o. male who is being seen for evaluation of the above, which began 1/22/2021 secondary to a twisting injury at work  . At today's visit, he is using no brace/assistive device. History is obtained today from:   [x]  the patient     [x]  EMR     []  one family member/friend    []  multiple family members/friends    []  other:      He denies any pain in his right foot/ankle, and denies any issue prior to his injury. He reports a sense of numbness and tingling medially throughout his midfoot and forefoot. He denies any limitations from this, reports he has been back to work full-time without restriction, and states that he is able to live with this. INTERVAL HISTORY 11/22/2021:  He is seen again today in the office for follow up of imaging as below. Since being seen last, the patient is doing about the same overall. At today's visit, he is using no brace/assistive device. History is obtained today from:   [x]  the patient     [x]  EMR     []  one family member/friend    []  multiple family members/friends    []  other:         REVIEW OF SYSTEMS:  Musculoskeletal: See HPI for pertinent positives     Past Medical History:    He  has a past medical history of Abdominal hernia, Asymptomatic varicose veins, Cervicalgia, Disorders of bilirubin excretion, Disturbance of skin sensation, Fear of flying, Gilbert's syndrome, and MAGDIEL on CPAP. Past Surgical History:    He  has a past surgical history that includes Appendectomy; hernia repair (2010); and Colonoscopy (N/A, 10/7/2021).      Current Medications:     Current Outpatient Medications:     polyethylene glycol (GLYCOLAX) 17 GM/SCOOP powder, Follow Instructions provided by physician's office (Patient not taking: Reported on 11/4/2021), Disp: 238 g, Rfl: 0    bisacodyl (BISACODYL) 5 MG EC tablet, Take 4 tablets in the morning (Patient not taking: Reported on 11/4/2021), Disp: 4 tablet, Rfl: 0    FIBER COMPLETE PO, Take 2 tablets by mouth daily, Disp: , Rfl:      Allergies:    Patient has no known allergies. Family History:  family history includes Breast Cancer (age of onset: 58) in his mother; Colon Cancer (age of onset: 59) in his father; Prostate Cancer (age of onset: 61) in his maternal uncle; Prostate Cancer (age of onset: 72) in his maternal grandfather. Social History:   Social History     Occupational History    Not on file   Tobacco Use    Smoking status: Never Smoker    Smokeless tobacco: Never Used   Vaping Use    Vaping Use: Never used   Substance and Sexual Activity    Alcohol use: Yes     Comment: socially -rare    Drug use: Never    Sexual activity: Not on file     Occupation: [employer]     OBJECTIVE:  Temp 97.9 °F (36.6 °C)   Resp 14   Ht 5' 10\" (1.778 m)   Wt 220 lb (99.8 kg)   BMI 31.57 kg/m²    Psych: alert and oriented to person, time, and place   Cardio:  well perfused extremities, no cyanosis   Resp:  normal respiratory effort  Musculoskeletal:    Affected lower extremity:    Vascular: Limb well perfused, compartments soft/compressible. Skin: No erythema/ulcers. Intact. Neurovascular Status:  Grossly neurovascularly intact throughout but diminished sensation medially throughout the midfoot and forefoot  Motion:  Grossly able to fire major muscle groups with appropriate/expected AROM  Tenderness to Palpation: None  -Tinel's is positive at the tarsal tunnel       RADIOLOGY:   11/22/2021 Prior images reviewed for reference. MRI images and radiology report reviewed, as below:    1. Mild degenerative changes as detailed above. 2. Low-grade partial split tearing of peroneus brevis tendon. 3. No acute fracture or dislocation. 4. Evidence of remote injury/sprain of the ATFL and calcaneofibular ligament. FINDINGS:  Three views (AP, Mortise, and Lateral) of the right ankle and three views (AP, Oblique, Lateral) of the right foot were obtained in the office today and reviewed, revealing no acute fracture, dislocation, or radiopaque foreign body/tumor. IMPRESSION:  No acute fracture/dislocation. Electronically signed by Porfirio Danielle MD      Relevant previous imaging reviewed, both imaging and report(s) as below:    No results found. ASSESSMENT AND PLAN:                         Body mass index is 31.57 kg/m². 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 On MRI, he has no evidence of space-occupying lesion in the tarsal tunnel, and denies any pain or significant change in his symptoms. Notably, he has no relevant past medical history. We had a discussion today about the likely diagnosis and its natural history, physical exam and imaging findings, as well as various treatment options in detail. Surgically, we again discussed a possible right foot tarsal tunnel decompression, however, given the lack of space-occupying lesion, we did discuss that a decompression would have a relatively unreliable outcome, and would not necessarily improve his numbness/tingling. We also discussed that as he has no limitations (he has been working full-time without restriction), and has no pain, surgical invention is likely not the best option for him at this time. He does state that he is able to live with this, but has been concerned about possible future progression of this issue. Orders/referrals were placed as below at today's visit. An EMG/NCS was ordered today. The patient was also referred to neurology. All questions were answered and the above plan was agreed upon.  The

## 2022-01-10 DIAGNOSIS — G57.51 TARSAL TUNNEL SYNDROME OF RIGHT SIDE: Primary | ICD-10-CM

## 2022-01-10 DIAGNOSIS — T14.8XXA NERVE INJURY: ICD-10-CM

## 2022-01-10 DIAGNOSIS — M79.671 RIGHT FOOT PAIN: ICD-10-CM

## 2022-01-26 ENCOUNTER — OFFICE VISIT (OUTPATIENT)
Dept: INTERNAL MEDICINE CLINIC | Age: 42
End: 2022-01-26
Payer: COMMERCIAL

## 2022-01-26 VITALS
WEIGHT: 231 LBS | HEART RATE: 67 BPM | DIASTOLIC BLOOD PRESSURE: 68 MMHG | BODY MASS INDEX: 33.15 KG/M2 | SYSTOLIC BLOOD PRESSURE: 112 MMHG | TEMPERATURE: 97.3 F | OXYGEN SATURATION: 98 %

## 2022-01-26 DIAGNOSIS — R07.89 ATYPICAL CHEST PAIN: ICD-10-CM

## 2022-01-26 DIAGNOSIS — L30.9 FACIAL DERMATITIS: Primary | ICD-10-CM

## 2022-01-26 PROCEDURE — 1036F TOBACCO NON-USER: CPT | Performed by: NURSE PRACTITIONER

## 2022-01-26 PROCEDURE — G8417 CALC BMI ABV UP PARAM F/U: HCPCS | Performed by: NURSE PRACTITIONER

## 2022-01-26 PROCEDURE — G8427 DOCREV CUR MEDS BY ELIG CLIN: HCPCS | Performed by: NURSE PRACTITIONER

## 2022-01-26 PROCEDURE — G8484 FLU IMMUNIZE NO ADMIN: HCPCS | Performed by: NURSE PRACTITIONER

## 2022-01-26 PROCEDURE — 99214 OFFICE O/P EST MOD 30 MIN: CPT | Performed by: NURSE PRACTITIONER

## 2022-01-26 RX ORDER — DIAPER,BRIEF,INFANT-TODD,DISP
EACH MISCELLANEOUS
Qty: 30 G | Refills: 1 | Status: SHIPPED | OUTPATIENT
Start: 2022-01-26 | End: 2022-02-02

## 2022-01-26 NOTE — PROGRESS NOTES
Visit Information    Have you changed or started any medications since your last visit including any over-the-counter medicines, vitamins, or herbal medicines? no   Have you stopped taking any of your medications? Is so, why? -  no  Are you having any side effects from any of your medications? - no    Have you seen any other physician or provider since your last visit? yes -    Have you had any other diagnostic tests since your last visit? yes -    Have you been seen in the emergency room and/or had an admission in a hospital since we last saw you?  no   Have you had your routine dental cleaning in the past 6 months?  yes -      Do you have an active MyChart account? If no, what is the barrier?   Yes    Patient Care Team:  Farrukh Singh MD as PCP - General (Internal Medicine)  Farrukh Singh MD as PCP - [facility]    Medical History Review  Past Medical, Family, and Social History reviewed and does not contribute to the patient presenting condition    Health Maintenance   Topic Date Due    COVID-19 Vaccine (1) Never done    DTaP/Tdap/Td vaccine (1 - Tdap) Never done    Flu vaccine (1) Never done    Depression Screen  06/14/2022    Lipid screen  11/11/2026    Hepatitis C screen  Completed    HIV screen  Completed    Hepatitis A vaccine  Aged Out    Hepatitis B vaccine  Aged Out    Hib vaccine  Aged Out    Meningococcal (ACWY) vaccine  Aged Out    Pneumococcal 0-64 years Vaccine  Aged Out         [address] [address]  Dept: [phone]  Dept Fax: [phone]    OfficeProgress/Follow Up Note  Date of patient's visit: 1/27/2022  Patient's Name:  Carmen Jacob YOB: 1980            Patient Care Team:  Farrukh Singh MD as PCP - General (Internal Medicine)  Farrukh Singh MD as PCP - [facility]    REASON FOR VISIT:  Routine outpatient follow up    HISTORY OF PRESENT ILLNESS:        History was obtained from the patient. Carmen Jacob is a 35 y.o. male here today for 3 Month Follow-Up (here for follow up, no issues), Skin Problem (has skin rash he has had for past 8 months, was using daughters eczema cream for treatment. ), and Chest Pain (complains of achy feeling for past 6 months, happens random.)    Fatigue   Improved with exercise  And improvements in work and home life       L chest wall pain  Years, intermittent  Increasing in frequency with 1-2 times per week  Alleviated with time and rest along with movement  Noted with folding/doing laundry  Patient denies aggravating factors, states pain can be noted during times of rest or following activity. He denies SOB, nausea, vomiting, back pain or abdominal pain. Patient Active Problem List   Diagnosis    MAGDIEL (obstructive sleep apnea)    Obesity (BMI 30.0-34. 9)    Fatigue due to sleep pattern disturbance    Abdominal pain, left lower quadrant    Mild obesity    Family history of colon cancer    S/P colonoscopic polypectomy    Hyperplastic polyp of descending colon       Health Maintenance Due   Topic Date Due    COVID-19 Vaccine (1) Never done    DTaP/Tdap/Td vaccine (1 - Tdap) Never done    Flu vaccine (1) Never done       MEDICATIONS:      Current Outpatient Medications   Medication Sig Dispense Refill    hydrocortisone (ALA-NYA) 1 % cream Apply topically 2 times daily. 30 g 1    polyethylene glycol (GLYCOLAX) 17 GM/SCOOP powder Follow Instructions provided by physician's office (Patient not taking: Reported on 11/4/2021) 238 g 0    bisacodyl (BISACODYL) 5 MG EC tablet Take 4 tablets in the morning (Patient not taking: Reported on 11/4/2021) 4 tablet 0    FIBER COMPLETE PO Take 2 tablets by mouth daily       No current facility-administered medications for this visit. ALLERGIES:    No Known Allergies      SOCIAL HISTORY    Reviewed and no change from previous record. Loretta Combs  reports that he has never smoked.  He has never used smokeless tobacco.    FAMILY HISTORY:    Reviewed and No change from previous visit    REVIEW OF SYSTEMS:    Review of Systems   Constitutional: Negative for appetite change, diaphoresis, fatigue, fever and unexpected weight change. HENT: Negative for ear pain, postnasal drip, rhinorrhea, sinus pain, sore throat and trouble swallowing. Eyes: Negative for visual disturbance. Respiratory: Negative for cough, chest tightness, shortness of breath and wheezing. Cardiovascular: Positive for chest pain (L chest wall). Negative for palpitations and leg swelling. Gastrointestinal: Negative for abdominal pain, blood in stool, constipation, diarrhea, nausea and vomiting. Endocrine: Negative for polydipsia, polyphagia and polyuria. Genitourinary: Negative for difficulty urinating, dysuria and flank pain. Musculoskeletal: Negative for arthralgias and myalgias. Skin: Positive for rash (noted on R cheek x 8 months, alleviated with daughters steroid cream). Negative for wound. Neurological: Negative for dizziness, syncope and weakness. All other systems reviewed and are negative. PHYSICAL EXAM:      Vitals:    01/26/22 1056   BP: 112/68   Pulse: 67   Temp: 97.3 °F (36.3 °C)   SpO2: 98%   Weight: 231 lb (104.8 kg)     BP Readings from Last 3 Encounters:   01/26/22 112/68   11/04/21 108/71   10/25/21 110/70      Wt Readings from Last 3 Encounters:   01/26/22 231 lb (104.8 kg)   11/22/21 220 lb (99.8 kg)   11/04/21 220 lb 14.4 oz (100.2 kg)       Physical Exam  Vitals reviewed. Constitutional:       Appearance: Normal appearance. HENT:      Head: Normocephalic. Cardiovascular:      Rate and Rhythm: Normal rate and regular rhythm. Pulses: Normal pulses. Heart sounds: Normal heart sounds. Pulmonary:      Effort: Pulmonary effort is normal.      Breath sounds: Normal breath sounds. Abdominal:      General: Bowel sounds are normal.      Palpations: Abdomen is soft. Musculoskeletal:         General: No swelling, tenderness or deformity. Normal range of motion. Skin:     General: Skin is warm and dry. Findings: Erythema (L cheek) present. Neurological:      General: No focal deficit present. Mental Status: He is alert and oriented to person, place, and time. Psychiatric:         Mood and Affect: Mood normal.         Behavior: Behavior normal.         Thought Content: Thought content normal.         Judgment: Judgment normal.          LABORATORY FINDINGS:    CBC:  Lab Results   Component Value Date    WBC 5.3 11/11/2021    HGB 15.8 11/11/2021     11/11/2021       BMP:    Lab Results   Component Value Date     11/11/2021    K 4.3 11/11/2021     11/11/2021    CO2 23 11/11/2021    BUN 15 11/11/2021    CREATININE 0.77 11/11/2021    GLUCOSE 90 11/11/2021       HEMOGLOBIN A1C:   Lab Results   Component Value Date    LABA1C 5.4 10/16/2020       FASTING LIPID PANEL:  Lab Results   Component Value Date    CHOL 245 (H) 11/11/2021    HDL 48 11/11/2021    TRIG 129 11/11/2021       ASSESSMENT AND PLAN:      1. Facial dermatitis  - hydrocortisone (ALA-NYA) 1 % cream; Apply topically 2 times daily. Dispense: 30 g; Refill: 1  - WON Screen With Reflex; Future  - Frank Flores MD, Dermatology, [facility]    2. Atypical chest pain  - EKG 12 lead; Future  - WON Screen With Reflex; Future  - may be musculoskeletal in nature, encourage to take NSAID PRN for pain    FOLLOW UP AND INSTRUCTIONS:   Return in about 6 weeks (around 3/9/2022) for CP. Solis Gallardo received counseling on the following healthy behaviors: nutrition, exercise and medication adherence    Discussed use, benefit, and side effects of prescribed medications. Barriers to medication compliance addressed. All patient questions answered. Patient voiced understanding.      Patient given educational materials - see patient instructions    TICO Bedoya - [address]  [facility]  1/27/2022, 9:13 AM    Please note that this chart was generated using voice recognition Dragon dictation software. Although everyeffort was made to ensure the accuracy of this automated transcription, some errors in transcription may have occurred.

## 2022-01-27 ASSESSMENT — ENCOUNTER SYMPTOMS
DIARRHEA: 0
NAUSEA: 0
COUGH: 0
SHORTNESS OF BREATH: 0
CONSTIPATION: 0
CHEST TIGHTNESS: 0
TROUBLE SWALLOWING: 0
SINUS PAIN: 0
RHINORRHEA: 0
ABDOMINAL PAIN: 0
SORE THROAT: 0
BLOOD IN STOOL: 0
VOMITING: 0
WHEEZING: 0

## 2022-02-24 ENCOUNTER — HOSPITAL ENCOUNTER (OUTPATIENT)
Age: 42
Setting detail: SPECIMEN
Discharge: HOME | End: 2022-02-24

## 2022-02-24 DIAGNOSIS — L30.9 FACIAL DERMATITIS: ICD-10-CM

## 2022-02-24 DIAGNOSIS — R07.89 ATYPICAL CHEST PAIN: ICD-10-CM

## 2022-02-25 LAB
ANTI DNA DOUBLE STRANDED: 2.5 IU/ML
ANTI-NUCLEAR ANTIBODY (ANA): NEGATIVE
ENA ANTIBODIES SCREEN: 0.3 U/ML

## 2022-03-09 ENCOUNTER — TELEPHONE (OUTPATIENT)
Dept: INTERNAL MEDICINE CLINIC | Age: 42
End: 2022-03-09

## 2022-03-09 ENCOUNTER — OFFICE VISIT (OUTPATIENT)
Dept: INTERNAL MEDICINE CLINIC | Age: 42
End: 2022-03-09
Payer: COMMERCIAL

## 2022-03-09 VITALS — BODY MASS INDEX: 33.43 KG/M2 | WEIGHT: 233 LBS | SYSTOLIC BLOOD PRESSURE: 124 MMHG | DIASTOLIC BLOOD PRESSURE: 82 MMHG

## 2022-03-09 DIAGNOSIS — Z80.0 FAMILY HISTORY OF COLON CANCER: ICD-10-CM

## 2022-03-09 DIAGNOSIS — K76.0 FATTY LIVER: ICD-10-CM

## 2022-03-09 DIAGNOSIS — E78.00 HYPERCHOLESTEROLEMIA: Primary | ICD-10-CM

## 2022-03-09 DIAGNOSIS — E66.9 OBESITY (BMI 30.0-34.9): ICD-10-CM

## 2022-03-09 DIAGNOSIS — F41.9 ANXIETY: Primary | ICD-10-CM

## 2022-03-09 PROBLEM — R53.83 FATIGUE DUE TO SLEEP PATTERN DISTURBANCE: Status: RESOLVED | Noted: 2019-09-24 | Resolved: 2022-03-09

## 2022-03-09 PROBLEM — R10.32 ABDOMINAL PAIN, LEFT LOWER QUADRANT: Status: RESOLVED | Noted: 2021-08-31 | Resolved: 2022-03-09

## 2022-03-09 PROBLEM — G47.9 FATIGUE DUE TO SLEEP PATTERN DISTURBANCE: Status: RESOLVED | Noted: 2019-09-24 | Resolved: 2022-03-09

## 2022-03-09 PROBLEM — K63.5 HYPERPLASTIC POLYP OF DESCENDING COLON: Status: RESOLVED | Noted: 2021-11-04 | Resolved: 2022-03-09

## 2022-03-09 PROCEDURE — G8484 FLU IMMUNIZE NO ADMIN: HCPCS | Performed by: INTERNAL MEDICINE

## 2022-03-09 PROCEDURE — 1036F TOBACCO NON-USER: CPT | Performed by: INTERNAL MEDICINE

## 2022-03-09 PROCEDURE — 99214 OFFICE O/P EST MOD 30 MIN: CPT | Performed by: INTERNAL MEDICINE

## 2022-03-09 PROCEDURE — G8427 DOCREV CUR MEDS BY ELIG CLIN: HCPCS | Performed by: INTERNAL MEDICINE

## 2022-03-09 PROCEDURE — G8417 CALC BMI ABV UP PARAM F/U: HCPCS | Performed by: INTERNAL MEDICINE

## 2022-03-09 RX ORDER — LORAZEPAM 1 MG/1
1 TABLET ORAL PRN
Qty: 10 TABLET | Refills: 5 | Status: SHIPPED | OUTPATIENT
Start: 2022-03-09 | End: 2022-03-19

## 2022-03-09 RX ORDER — ATORVASTATIN CALCIUM 20 MG/1
20 TABLET, FILM COATED ORAL
Qty: 90 TABLET | Refills: 3 | Status: SHIPPED | OUTPATIENT
Start: 2022-03-09

## 2022-03-09 NOTE — TELEPHONE ENCOUNTER
Patient stated he took Ativan over a year ago for a flight and it did not help.  Patient is requesting an alternative, possibly Alprazolam.     Please advise

## 2022-03-09 NOTE — TELEPHONE ENCOUNTER
Patient just saw Dr. Chandler Feeling and requested something for anxiety for flying. He does not want the same thing he had last time. Would just like enough to fly there and fly home.

## 2022-03-09 NOTE — PROGRESS NOTES
OFFICE VISIT  PROGRESS NOTE         Date of patient's visit: 3/9/22  Patient's Name:  Radha Luna  YOB: 1980       Patient Care Team:  Jaleel Tse MD as PCP - General (Internal Medicine)  Jaleel Tse [facility] P[facility]        SUBJECTIVE     HISTORY           History of present illness     Pertinent details  added to ,       Chief Complaint   Patient presents with    Follow-up    Results     lab work           Encounter Diagnoses   Name Primary?  Hypercholesterolemia Yes    Fatty liver     Obesity (BMI 30.0-34. 9)     Family history of colon cancer         Symptom / problem          --history obtained from patient. -   Family history of colon cancer  He had a repeat polypectomy done  His father had colon cancer  Patient was advised to follow-up with a 2-year follow-up visit for colonoscopy    Patient known to have obesity weight stable   History of fatty liver  Patient advised to lose 20 pounds  Counseling done        MEDICATIONS:      Current Outpatient Medications   Medication Sig Dispense Refill    atorvastatin (LIPITOR) 20 MG tablet Take 1 tablet by mouth Daily with supper 90 tablet 3    FIBER COMPLETE PO Take 2 tablets by mouth daily       LORazepam (ATIVAN) 1 MG tablet Take 1 tablet by mouth as needed (air travel) for up to 10 days. TAKE ONE TABLET BY MOUTH TWICE A DAY AS NEEDED 10 tablet 5    polyethylene glycol (GLYCOLAX) 17 GM/SCOOP powder Follow Instructions provided by physician's office (Patient not taking: Reported on 11/4/2021) 238 g 0    bisacodyl (BISACODYL) 5 MG EC tablet Take 4 tablets in the morning (Patient not taking: Reported on 11/4/2021) 4 tablet 0     No current facility-administered medications for this visit.        ALLERGIES:    No Known Allergies    SOCIAL HISTORY   Reviewed and no change from previous record. Toma Rios  reports that he has never smoked. He has never used smokeless tobacco.    FAMILY HISTORY:    Reviewed and No change from previous visit    REVIEW OF SYSTEMS:    Review of Systems        OBJECTIVE      Physical exam           Vitals:    03/09/22 1112   BP: 124/82   Site: Left Upper Arm   Weight: 233 lb (105.7 kg)         Physical Exam   Vitals:  /82 (Site: Left Upper Arm)   Wt 233 lb (105.7 kg)   BMI 33.43 kg/m²                 Body mass index is 33.43 kg/m².      Vitals:    03/09/22 1112   BP: 124/82   Site: Left Upper Arm   Weight: 233 lb (105.7 kg)       General -alert, well appearing, and in no distress  Skin - normal coloration and turgor, no rashes,no suspicious skin lesions noted  Eyes - pupils equal and reactive, extraocular eye movementsintact  Ears - bilateral TM's and external ear canals normal  Nose - normal and patent, no erythema, discharge or polyps  Mouth - mucous membranes are moist, pharynx normal without lesions  Neck - supple, no significant adenopathy  Lymphatics - no palpable lymphadenopathy, no hepatosplenomegaly    Chest - clear to auscultation, no wheezes, rales or rhonchi, symmetric air entry    Heart - normal rate, regular rhythm, no murmurs, rubs, clicks or gallops    Extremities - peripheral pulses normal, no pedal edema       Abdomen - soft, nontender, nondistended, no masses or organomegaly    Back - full range of motion, notenderness, palpable spasm or pain on motion    Neurological - alert, oriented, normal speech, no focal sensory or motor deficit  Musculoskeletal - no joint tenderness, deformity or swelling                DIAGNOSTICS / INSERTS / IMAGES    [x] Reviewed       Labs      URINE ANALYSIS: No results found for: LABURIN     CBC:  Lab Results   Component Value Date    WBC 5.3 11/11/2021    HGB 15.8 11/11/2021     11/11/2021        BMP:    Lab Results   Component Value Date  11/11/2021    K 4.3 11/11/2021     11/11/2021    CO2 23 11/11/2021    BUN 15 11/11/2021    CREATININE 0.77 11/11/2021    GLUCOSE 90 11/11/2021        No results found for: LDLC  Lab Results   Component Value Date    LABA1C 5.4 10/16/2020     No results found for: POCGLU   .     LIVER PROFILE:  Lab Results   Component Value Date    ALT 37 11/11/2021    AST 30 11/11/2021    PROT 6.9 11/11/2021    BILITOT 2.27 11/11/2021    BILIDIR 0.24 10/30/2020    LABALBU 4.5 11/11/2021          Results for orders placed or performed during the hospital encounter of 02/24/22   WON Screen With Reflex   Result Value Ref Range    WON NEGATIVE NEGATIVE    JOVANNI Antibodies Screen 0.3 <0.7 U/mL    Anti ds DNA 2.5 <10.0 IU/mL                     VITALS INCLUDING BMI REVIEWED WITH PATIENT  Labs reviewed as noted above   Discussed with patient        ASSESSMENT / Omega Cassi was seen today for follow-up and results. Diagnoses and all orders for this visit:    Hypercholesterolemia  Patient has hyperlipidemia  Total cholesterol 240+    Educated  Started on Lipitor    -     Lipid, Fasting; Future    Fatty liver  Will check liver function  Especially with Lipitor being added  -     Hepatic Function Panel; Future    Obesity (BMI 30.0-34. 9)  Stable  Counseling done  Family history of colon cancer  Advised to get colonoscopy examination periodically as scheduled  Other orders  -     atorvastatin (LIPITOR) 20 MG tablet; Take 1 tablet by mouth Daily with supper               [place] VISIT       Today's office visit SALIENT ACTIONS    --Started on Lipitor for LDL of 171--            Discussed use, benefit, and side effects of prescribed medications. [x] yes    All patient questions answered. Patient voiced understanding. Patient given educational materials - see patient instructions  [] yes         There are no discontinued medications.      Orders Placed This Encounter   Procedures    Lipid, Fasting     Standing Status:   Future     Standing Expiration Date:   3/9/2023    Hepatic Function Panel     Standing Status:   Future     Standing Expiration Date:   3/9/2023        There are no Patient Instructions on file for this visit. Medication List          Accurate as of March 9, 2022  4:32 PM. If you have any questions, ask your nurse or doctor. START taking these medications    atorvastatin 20 MG tablet  Commonly known as: LIPITOR  Take 1 tablet by mouth Daily with supper  Started by: Bo Hoskins MD     LORazepam 1 MG tablet  Commonly known as: ATIVAN  Take 1 tablet by mouth as needed (air travel) for up to 10 days. TAKE ONE TABLET BY MOUTH TWICE A DAY AS NEEDED  Started by: Bo Hoskins MD        CONTINUE taking these medications    bisacodyl 5 MG EC tablet  Generic drug: bisacodyl  Take 4 tablets in the morning     FIBER COMPLETE PO     polyethylene glycol 17 GM/SCOOP powder  Commonly known as: GLYCOLAX  Follow Instructions provided by physician's office           Where to Get Your Medications      These medications were sent to [facility] [address], [address], [address] [place] [address]    Phone: [phone] ·   atorvastatin 20 MG tablet  · LORazepam 1 MG tablet             FOLLOW UP  PLANS     Return in about 6 months (around 9/9/2022). MD MARYLIN [address], [address] [place] [address]. Phone [phone]   Fax: [phone]  Answering Service: [phone]  Visit Information    Have you changed or started any medications since your last visit including any over-the-counter medicines, vitamins, or herbal medicines? no   Are you having any side effects from any of your medications? -  no  Have you stopped taking any of your medications? Is so, why? -  no    Have you seen any other physician or provider since your last visit?  No  Have you had any other diagnostic tests since your last visit? No  Have you been seen in the emergency room and/or had an admission to a hospital since we last saw you? No  Have you had your routine dental cleaning in the past 6 months? yes -     Have you activated your [employer] account? If not, what are your barriers?  Yes     Patient Care Team:  Bayron Aguilar MD as PCP - General (Internal Medicine)  Bayron Aguilar MD as PCP - [facility]    Medical History Review  Past Medical, Family, and Social History reviewed and does not contribute to the patient presenting condition    Health Maintenance   Topic Date Due    COVID-19 Vaccine (1) Never done    DTaP/Tdap/Td vaccine (1 - Tdap) Never done    Flu vaccine (1) Never done    Depression Screen  06/14/2022    Lipid screen  11/11/2026    Hepatitis C screen  Completed    HIV screen  Completed    Hepatitis A vaccine  Aged Out    Hepatitis B vaccine  Aged Out    Hib vaccine  Aged Out    Meningococcal (ACWY) vaccine  Aged Out    Pneumococcal 0-64 years Vaccine  Aged Out

## 2022-03-28 ENCOUNTER — HOSPITAL ENCOUNTER (OUTPATIENT)
Dept: NEUROLOGY | Age: 42
Discharge: HOME | End: 2022-03-28
Payer: COMMERCIAL

## 2022-03-28 DIAGNOSIS — M79.671 RIGHT FOOT PAIN: ICD-10-CM

## 2022-03-28 DIAGNOSIS — G57.51 TARSAL TUNNEL SYNDROME OF RIGHT SIDE: ICD-10-CM

## 2022-03-28 DIAGNOSIS — T14.8XXA NERVE INJURY: ICD-10-CM

## 2022-03-28 PROCEDURE — 95909 NRV CNDJ TST 5-6 STUDIES: CPT | Performed by: PHYSICAL MEDICINE & REHABILITATION

## 2022-03-28 PROCEDURE — 95886 MUSC TEST DONE W/N TEST COMP: CPT | Performed by: PHYSICAL MEDICINE & REHABILITATION

## 2022-04-25 ENCOUNTER — TELEPHONE (OUTPATIENT)
Dept: ORTHOPEDIC SURGERY | Age: 42
End: 2022-04-25

## 2022-04-25 NOTE — TELEPHONE ENCOUNTER
Representative from [employer] a [employer] messaging inquiring about office notes/paperwork that he needed returned to him - Renetta Moon , he did not leave a call back # , only left fax # [phone]

## 2022-04-25 NOTE — TELEPHONE ENCOUNTER
I faxed clinical notes to provided fax. Unsure what is meant by [employer] and since Kaylee Gerard did not provide a phone # I have no way of inquiring about this.

## 2022-04-28 ENCOUNTER — TELEPHONE (OUTPATIENT)
Dept: ORTHOPEDIC SURGERY | Age: 42
End: 2022-04-28

## 2022-04-28 NOTE — TELEPHONE ENCOUNTER
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 Pt last seen on 11/22/21 with no future appointments scheduled. Pt did have C-9 approved EMG and neuro consult completed recently scanned into chart on 3/31/22 under Dr Taylor's name.     Call back number for  is [phone]

## 2022-05-03 ENCOUNTER — NURSE TRIAGE (OUTPATIENT)
Dept: OTHER | Facility: CLINIC | Age: 42
End: 2022-05-03

## 2022-05-03 NOTE — TELEPHONE ENCOUNTER
Received call from [facility] with [employer]. Subjective: Caller states \"he has a headache, chills, and body aches, stuffy nose\"     Current Symptoms: see above    Onset: 1.5 days ago; worsening    Associated Symptoms: reduced appetite    Pain Severity: 4/10; aching; constant    Temperature: 100.5 orally    What has been tried: nahum sabillon    LMP: NA Pregnant: No    Recommended disposition: See in Office Today    Care advice provided, patient verbalizes understanding; denies any other questions or concerns; instructed to call back for any new or worsening symptoms. Patient/Caller agrees with recommended disposition; writer provided warm transfer to [place] [facility] for appointment scheduling     Attention Provider: Thank you for allowing me to participate in the care of your patient. The patient was connected to triage in response to information provided to the ECC/PSC. Please do not respond through this encounter as the response is not directed to a shared pool.         Reason for Disposition   Patient wants to be seen    Protocols used: HEADACHE-ADULT-[place]

## 2022-05-06 ENCOUNTER — TELEPHONE (OUTPATIENT)
Dept: ORTHOPEDIC SURGERY | Age: 42
End: 2022-05-06

## 2022-05-06 NOTE — TELEPHONE ENCOUNTER
Left VM for patient to call back. Need to find out if he had his Neuro Consult that Dr. Abigail Grewal recommended. He has an upcoming appt on 5/9, if he has not obtained that, he will need to re-schedule once it is complete so Dr. Abigail Grewal can review the consult and EMG with him. If he did do a consult with Neuro, we need to find out who he went to so we can obtain the clinical note.

## 2022-05-09 ENCOUNTER — OFFICE VISIT (OUTPATIENT)
Dept: ORTHOPEDIC SURGERY | Age: 42
End: 2022-05-09
Payer: COMMERCIAL

## 2022-05-09 VITALS — BODY MASS INDEX: 33.36 KG/M2 | RESPIRATION RATE: 16 BRPM | WEIGHT: 233 LBS | HEIGHT: 70 IN

## 2022-05-09 DIAGNOSIS — T14.8XXA NERVE INJURY: Primary | ICD-10-CM

## 2022-05-09 PROCEDURE — 99213 OFFICE O/P EST LOW 20 MIN: CPT | Performed by: ORTHOPAEDIC SURGERY

## 2022-05-09 NOTE — PROGRESS NOTES
[facility].  [address] [address]  Dept: [phone]    Ambulatory Orthopedic Consult      CHIEF COMPLAINT:    Chief Complaint   Patient presents with    Foot Problem     Right    Numbness       HISTORY OF PRESENT ILLNESS:      The patient is a 35 y.o. male who is being seen for evaluation of the above, which began 1/22/2021 secondary to a twisting injury at work  . At today's visit, he is using no brace/assistive device. History is obtained today from:   [x]  the patient     [x]  EMR     []  one family member/friend    []  multiple family members/friends    []  other:      He denies any pain in his right foot/ankle, and denies any issue prior to his injury. He reports a sense of numbness and tingling medially throughout his midfoot and forefoot. He denies any limitations from this, reports he has been back to work full-time without restriction, and states that he is able to live with this. INTERVAL HISTORY 11/22/2021:  He is seen again today in the office for follow up of imaging as below. Since being seen last, the patient is doing about the same overall. At today's visit, he is using no brace/assistive device. History is obtained today from:   [x]  the patient     [x]  EMR     []  one family member/friend    []  multiple family members/friends    []  other:      INTERVAL HISTORY 5/9/2022:  He is seen again today in the office for follow up of a previous issue (as above). Since being seen last, the patient is doing about the same overall. At today's visit, he is not using a brace or assistive device. History is obtained today from:   [x]  the patient     []  EMR     []  one family member/friend    []  multiple family members/friends    []  other:      Since being seen here last, he has had an EMG/NCS performed, but has not been to neurology.   At today's visit, he denies any pain, but reports numbness/tingling in the dorsal aspect of his midfoot/forefoot, and reports a sensation as though his foot is \"falling asleep\". REVIEW OF SYSTEMS:  Musculoskeletal: See HPI for pertinent positives     Past Medical History:    He  has a past medical history of Abdominal hernia, Asymptomatic varicose veins, Cervicalgia, Disorders of bilirubin excretion, Disturbance of skin sensation, Fear of flying, Gilbert's syndrome, and MAGDIEL on CPAP. Past Surgical History:    He  has a past surgical history that includes Appendectomy; hernia repair (2010); and Colonoscopy (N/A, 10/7/2021). Current Medications:     Current Outpatient Medications:     atorvastatin (LIPITOR) 20 MG tablet, Take 1 tablet by mouth Daily with supper, Disp: 90 tablet, Rfl: 3    polyethylene glycol (GLYCOLAX) 17 GM/SCOOP powder, Follow Instructions provided by physician's office (Patient not taking: Reported on 11/4/2021), Disp: 238 g, Rfl: 0    bisacodyl (BISACODYL) 5 MG EC tablet, Take 4 tablets in the morning (Patient not taking: Reported on 11/4/2021), Disp: 4 tablet, Rfl: 0    FIBER COMPLETE PO, Take 2 tablets by mouth daily , Disp: , Rfl:      Allergies:    Patient has no known allergies. Family History:  family history includes Breast Cancer (age of onset: 58) in his mother; Colon Cancer (age of onset: 59) in his father; Prostate Cancer (age of onset: 61) in his maternal uncle; Prostate Cancer (age of onset: 72) in his maternal grandfather.     Social History:   Social History     Occupational History    Not on file   Tobacco Use    Smoking status: Never Smoker    Smokeless tobacco: Never Used   Vaping Use    Vaping Use: Never used   Substance and Sexual Activity    Alcohol use: Yes     Comment: socially -rare    Drug use: Never    Sexual activity: Not on file     Occupation: [employer]     OBJECTIVE:  Resp 16   Ht 5' 10\" (1.778 m)   Wt 233 lb (105.7 kg)   BMI 33.43 kg/m²    Psych: alert and oriented to person, time, and place   Cardio:  well perfused extremities, no cyanosis   Resp:  normal respiratory effort  Musculoskeletal:    Affected lower extremity:    Vascular: Limb well perfused, compartments soft/compressible. Skin: No erythema/ulcers. Intact. Neurovascular Status:  Grossly neurovascularly intact throughout but diminished sensation at the dorsal midfoot (previously diminished more at the medial midfoot/forefoot)  Motion:  Grossly able to fire major muscle groups with appropriate/expected AROM  Tenderness to Palpation: None  -Tinel's negative (Tinel's was previously positive at the tarsal tunnel)  - Negative Tinel's at the superficial peroneal nerve      RADIOLOGY:   5/9/2022 No new radiology images today. Prior images reviewed for reference. MRI images and radiology report reviewed, as below:    1. Mild degenerative changes as detailed above. 2. Low-grade partial split tearing of peroneus brevis tendon. 3. No acute fracture or dislocation. 4. Evidence of remote injury/sprain of the ATFL and calcaneofibular ligament. FINDINGS:  Three views (AP, Mortise, and Lateral) of the right ankle and three views (AP, Oblique, Lateral) of the right foot were obtained in the office today and reviewed, revealing no acute fracture, dislocation, or radiopaque foreign body/tumor. IMPRESSION:  No acute fracture/dislocation. Electronically signed by Harpreet Yan MD      EMG/NCS:   EMG/NCS report reviewed. Outside impression:  \" There is currently no electrophysiologic evidence of a significant right lower extremity syndrome noted at this time. Please note, however, that the bilateral superficial peroneal sensory's were unobtainable which may be technical in nature. Clinical correlation is recommended. \"       ASSESSMENT AND PLAN:                         Body mass index is 33.43 kg/m².                                                     He has right medial forefoot/midfoot numbness/tingling, possibly secondary to a stretch injury, from his injury on 1/22/2021 at work. On MRI, he has no evidence of space-occupying lesion in the tarsal tunnel, and an EMG/NCS showed no electrophysiologic evidence of significant right lower extremity syndrome, however, bilateral superficial peroneal sensories were unobtainable (possibly secondary to technical issues). We discussed that the differential diagnosis I am considering includes the following: Stretch injury/palsy, neuropathy, and radiculopathy. Notably, he has no relevant past medical history. We had a discussion today about the likely diagnosis and its natural history, physical exam and imaging findings, as well as various treatment options in detail. Surgically, we discussed that no surgical intervention is indicated, and I recommended conservative management. At this time, he still has no pain, and reports that he can live with his numbness/tingling. We had previously discussed a right foot tarsal tunnel decompression, however, at today's visit his symptoms of numbness/tingling are localized to the dorsal midfoot/forefoot. Orders/referrals were placed as below at today's visit. The patient was previously referred to neurology, but has not been seen by neurology at this time. The patient was referred to PM&R to eval and treat. All questions were answered and the above plan was agreed upon. The patient will return to clinic in the future as needed.            At the patient's next visit, depending on how the patient is doing and/or new imaging/labs results, we may consider the following options:    []  Lace up ankle     []  CAM boot         []  removable wrist brace     []  PT:        []  Wean out immobilization         []  Adv activity      []  Footmind        []  Spenco       []  Custom Orthotic:               []  AZ brace                    []  Rocker Bottom      []  Night splint    []  Heel cups        []  Strap        []  Toe gizmos    [] Topl        []  NSAIDs         []  Erlin        []  Ref:         []  Stress Xray    []  CT        []  MRI  []  Inj:          []  Consider OR      []  Pick OR date    No follow-ups on file. No orders of the defined types were placed in this encounter. No orders of the defined types were placed in this encounter. Carmine Garvey MD  Orthopedic Surgery        Please excuse any typos/errors, as this note was created with the assistance of voice recognition software. While intending to generate a document that actually reflects the content of the visit, the document can still have some errors including those of syntax and sound-a-like substitutions which may escape proof reading. In such instances, actual meaning can be extrapolated by context.

## 2022-06-09 ENCOUNTER — HOSPITAL ENCOUNTER (OUTPATIENT)
Age: 42
Setting detail: SPECIMEN
Discharge: HOME | End: 2022-06-09

## 2022-06-09 DIAGNOSIS — E78.00 HYPERCHOLESTEROLEMIA: ICD-10-CM

## 2022-06-09 DIAGNOSIS — K76.0 FATTY LIVER: ICD-10-CM

## 2022-06-09 LAB
ALBUMIN SERPL-MCNC: 4.6 G/DL (ref 3.5–5.2)
ALBUMIN/GLOBULIN RATIO: 2.1 (ref 1–2.5)
ALP BLD-CCNC: 66 U/L (ref 40–129)
ALT SERPL-CCNC: 31 U/L (ref 5–41)
AST SERPL-CCNC: 22 U/L
BILIRUB SERPL-MCNC: 2.67 MG/DL (ref 0.3–1.2)
BILIRUBIN DIRECT: 0.31 MG/DL
BILIRUBIN, INDIRECT: 2.36 MG/DL (ref 0–1)
CHOLESTEROL, FASTING: 177 MG/DL
CHOLESTEROL/HDL RATIO: 4
HDLC SERPL-MCNC: 44 MG/DL
LDL CHOLESTEROL: 112 MG/DL (ref 0–130)
TOTAL PROTEIN: 6.8 G/DL (ref 6.4–8.3)
TRIGLYCERIDE, FASTING: 105 MG/DL

## 2025-02-04 NOTE — OP NOTE
PROCEDURE NOTE    DATE OF PROCEDURE: 10/7/2021    SURGEON: Ramírez Post MD    ASSISTANT: None    PREOPERATIVE DIAGNOSIS: FAMILY HX OF COLON CANCER    POSTOPERATIVE DIAGNOSIS: as described below    OPERATION: Total colonoscopy     ANESTHESIA: MAC PER ANESTHESIA     ESTIMATED BLOOD LOSS: less than 50     COMPLICATIONS: None. SPECIMENS:  Was Obtained:     HISTORY: The patient is a 35y.o. year old male with history of above preop diagnosis. I recommended colonoscopy with possible biopsy or polypectomy and I explained the risk, benefits, expected outcome, and alternatives to the procedure. Risks included but are not limited to bleeding, infection, respiratory distress, hypotension, and perforation of the colon and possibility of missing a lesion. The patient understands and is in agreement. PROCEDURE: The patient was given IV conscious sedation. The patient's SPO2 remained above 90% throughout the procedure. The colonoscope was inserted per rectum and advanced under direct vision to the cecum without difficulty. The prep was good. Findings:  Terminal ileum: normal    Cecum/Ascending colon: normal    Transverse colon: A 3 MM POLYP WAS EXCISED WITH JUMBO BIOPSY FORCEPS FROM THE MID T COLON AREA    Descending/Sigmoid colon: normal    Rectum/Anus: examined in normal and retroflexed positions and was abnormal: MOD INT HEMORRHOIDS    Withdrawal Time was (minutes): 12    The colon was decompressed and the scope was removed. The patient tolerated the procedure well. Recommendations/Plan:   1. Lifestyle and dietary modifications as discussed  2. F/U Biopsies  3. F/U In Office in 3-4 weeks  4. Discussed with the family  5. Colonoscopy Recall :3 year  6. POST SEDATION PATIENT WAS STABLE WITH STABLE VITAL SIGNS AND OXYGEN SATURATIONS AND WAS DISCHARGED HOME WITH RIDE IN A STABLE CONDITION.     Electronically signed by Ramírez Post MD  on 10/7/2021 at 9:30 AM Subjective   Patient ID: Chelsi Starr NOE (antinuclear antibody)    Pregnancy 

## (undated) DEVICE — GLOVE ORTHO 8   MSG9480

## (undated) DEVICE — DEFENDO AIR WATER SUCTION AND BIOPSY VALVE KIT FOR  OLYMPUS: Brand: DEFENDO AIR/WATER/SUCTION AND BIOPSY VALVE

## (undated) DEVICE — FORCEPS BX L240CM JAW DIA2.8MM L CAP W/ NDL MIC MESH TOOTH

## (undated) DEVICE — ENDO KIT W/SYRINGE: Brand: MEDLINE INDUSTRIES, INC.